# Patient Record
Sex: FEMALE | Race: WHITE | Employment: FULL TIME | URBAN - METROPOLITAN AREA
[De-identification: names, ages, dates, MRNs, and addresses within clinical notes are randomized per-mention and may not be internally consistent; named-entity substitution may affect disease eponyms.]

---
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
## 2022-01-31 ENCOUNTER — OFFICE VISIT (OUTPATIENT)
Dept: OBGYN | Age: 22
End: 2022-01-31
Payer: COMMERCIAL

## 2022-01-31 VITALS
WEIGHT: 216 LBS | HEIGHT: 64 IN | DIASTOLIC BLOOD PRESSURE: 85 MMHG | SYSTOLIC BLOOD PRESSURE: 130 MMHG | BODY MASS INDEX: 36.88 KG/M2

## 2022-01-31 DIAGNOSIS — N94.6 DYSMENORRHEA: Primary | ICD-10-CM

## 2022-01-31 PROCEDURE — 99213 OFFICE O/P EST LOW 20 MIN: CPT

## 2022-01-31 PROCEDURE — G8484 FLU IMMUNIZE NO ADMIN: HCPCS

## 2022-01-31 PROCEDURE — G8427 DOCREV CUR MEDS BY ELIG CLIN: HCPCS

## 2022-01-31 PROCEDURE — G8417 CALC BMI ABV UP PARAM F/U: HCPCS

## 2022-01-31 PROCEDURE — 1036F TOBACCO NON-USER: CPT

## 2022-01-31 RX ORDER — MEDROXYPROGESTERONE ACETATE 150 MG/ML
150 INJECTION, SUSPENSION INTRAMUSCULAR ONCE
Qty: 1 ML | Refills: 3
Start: 2022-01-31 | End: 2022-01-31

## 2022-01-31 ASSESSMENT — ENCOUNTER SYMPTOMS
RESPIRATORY NEGATIVE: 1
GASTROINTESTINAL NEGATIVE: 1
ABDOMINAL PAIN: 0

## 2022-01-31 NOTE — PROGRESS NOTES
1/31/22    Louise Orlando  2000    Chief Complaint   Patient presents with    Dysmenorrhea     Pt still having pain during and after cycle, Marleen Lujan started her on ocp but pt states she doesnt remember to take it, so she would like to discuss other options. Sarwat Alberts is a 25 y.o. female who presents today for evaluation of dysmenorrhea and contraceptive management. Pt has tried Sprintec and Seasonique for dysmenorrhea without satisfactory resolution of symptoms, is interested in discussing other options. Pt states she is mostly interested in depo injection at this time. She reports difficulty with pills, remembering to take them as directed. Past Medical History:   Diagnosis Date    Anxiety     Depression     IBS (irritable bowel syndrome)     Migraine     Obesity     Urinary frequency        Past Surgical History:   Procedure Laterality Date    WISDOM TOOTH EXTRACTION         Social History     Tobacco Use    Smoking status: Former Smoker    Smokeless tobacco: Never Used   Vaping Use    Vaping Use: Never used   Substance Use Topics    Alcohol use: No    Drug use: No       Family History   Problem Relation Age of Onset    Heart Disease Maternal Grandmother     Mental Illness Father     Mental Illness Mother        Current Outpatient Medications   Medication Sig Dispense Refill    Levothyroxine Sodium (LEVOTHROID PO) Take by mouth      medroxyPROGESTERone (DEPO-PROVERA) 150 MG/ML injection Inject 1 mL into the muscle once for 1 dose 1 mL 3    etodolac (LODINE) 400 MG tablet Take 400 mg by mouth 2 times daily      Hydroxychloroquine Sulfate (PLAQUENIL PO) Take by mouth      FLUoxetine (PROZAC) 10 MG capsule Take 10 mg by mouth daily      buPROPion (WELLBUTRIN XL) 150 MG extended release tablet Take 150 mg by mouth every morning       No current facility-administered medications for this visit.        Allergies   Allergen Reactions    Pcn [Penicillins]      Father Anabell Castillotyrone of reaction        No obstetric history on file. Immunization History   Administered Date(s) Administered    COVID-19, Pfizer Purple top, DILUTE for use, 12+ yrs, 30mcg/0.3mL dose 04/14/2021, 05/05/2021, 01/10/2022       Review of Systems   Constitutional: Negative. Negative for fatigue and fever. Respiratory: Negative. Gastrointestinal: Negative. Negative for abdominal pain. Endocrine: Negative. Genitourinary: Positive for menstrual problem. Negative for vaginal pain. Musculoskeletal: Negative. Skin: Negative. Neurological: Negative. Negative for dizziness and headaches. Psychiatric/Behavioral: Negative. /85   Ht 5' 4\" (1.626 m)   Wt 216 lb (98 kg)   LMP 01/14/2022   BMI 37.08 kg/m²     Physical Exam  Vitals and nursing note reviewed. Constitutional:       General: She is not in acute distress. Appearance: Normal appearance. She is obese. HENT:      Head: Normocephalic and atraumatic. Pulmonary:      Effort: Pulmonary effort is normal. No respiratory distress. Musculoskeletal:         General: Normal range of motion. Cervical back: Normal range of motion. Skin:     General: Skin is warm and dry. Neurological:      General: No focal deficit present. Mental Status: She is alert and oriented to person, place, and time. Psychiatric:         Mood and Affect: Mood normal.         Speech: Speech normal.         Behavior: Behavior normal. Behavior is cooperative. Thought Content: Thought content normal.         No results found for this visit on 01/31/22. ASSESSMENT AND PLAN   Diagnosis Orders   1. Dysmenorrhea  medroxyPROGESTERone (DEPO-PROVERA) 150 MG/ML injection     Various contraceptive methods reviewed with patient, including: OCPs, IUDs, Nexplanon, Depo injection, NuvaRing, patch. Commonly reported complaints/side effect profiles with each reviewed in detail, as well as advantages of each option.  Pt wishes to proceed with depo injection

## 2022-03-12 ENCOUNTER — APPOINTMENT (OUTPATIENT)
Dept: ULTRASOUND IMAGING | Age: 22
End: 2022-03-12
Payer: COMMERCIAL

## 2022-03-12 ENCOUNTER — HOSPITAL ENCOUNTER (EMERGENCY)
Age: 22
Discharge: HOME | End: 2022-03-12
Attending: EMERGENCY MEDICINE
Payer: COMMERCIAL

## 2022-03-12 VITALS
RESPIRATION RATE: 18 BRPM | BODY MASS INDEX: 33.32 KG/M2 | HEART RATE: 83 BPM | HEIGHT: 65 IN | SYSTOLIC BLOOD PRESSURE: 145 MMHG | DIASTOLIC BLOOD PRESSURE: 85 MMHG | WEIGHT: 200 LBS | OXYGEN SATURATION: 100 %

## 2022-03-12 DIAGNOSIS — O46.8X1 SUBCHORIONIC HEMORRHAGE OF PLACENTA IN FIRST TRIMESTER, SINGLE OR UNSPECIFIED FETUS: ICD-10-CM

## 2022-03-12 DIAGNOSIS — O41.8X10 SUBCHORIONIC HEMORRHAGE OF PLACENTA IN FIRST TRIMESTER, SINGLE OR UNSPECIFIED FETUS: ICD-10-CM

## 2022-03-12 DIAGNOSIS — O46.90 VAGINAL BLEEDING IN PREGNANCY: Primary | ICD-10-CM

## 2022-03-12 LAB
ABO/RH: NORMAL
ALBUMIN SERPL-MCNC: 4.1 GM/DL (ref 3.4–5)
ALP BLD-CCNC: 71 IU/L (ref 40–129)
ALT SERPL-CCNC: 23 U/L (ref 10–40)
ANION GAP SERPL CALCULATED.3IONS-SCNC: 12 MMOL/L (ref 4–16)
ANTIBODY SCREEN: NEGATIVE
AST SERPL-CCNC: 18 IU/L (ref 15–37)
BACTERIA: NEGATIVE /HPF
BASOPHILS ABSOLUTE: 0.1 K/CU MM
BASOPHILS RELATIVE PERCENT: 0.5 % (ref 0–1)
BILIRUB SERPL-MCNC: 0.3 MG/DL (ref 0–1)
BILIRUBIN URINE: NEGATIVE MG/DL
BLOOD, URINE: ABNORMAL
BUN BLDV-MCNC: 14 MG/DL (ref 6–23)
CALCIUM SERPL-MCNC: 9 MG/DL (ref 8.3–10.6)
CAST TYPE: NEGATIVE /HPF
CHLORIDE BLD-SCNC: 104 MMOL/L (ref 99–110)
CLARITY: CLEAR
CO2: 21 MMOL/L (ref 21–32)
COLOR: YELLOW
CREAT SERPL-MCNC: 0.8 MG/DL (ref 0.6–1.1)
CRYSTAL TYPE: NEGATIVE /HPF
DIFFERENTIAL TYPE: ABNORMAL
EOSINOPHILS ABSOLUTE: 0.3 K/CU MM
EOSINOPHILS RELATIVE PERCENT: 3 % (ref 0–3)
EPITHELIAL CELLS, UA: 3 /HPF
GFR AFRICAN AMERICAN: >60 ML/MIN/1.73M2
GFR NON-AFRICAN AMERICAN: >60 ML/MIN/1.73M2
GLUCOSE BLD-MCNC: 75 MG/DL (ref 70–99)
GLUCOSE, URINE: NEGATIVE MG/DL
GONADOTROPIN, CHORIONIC (HCG) QUANT: 8223 UIU/ML
HCT VFR BLD CALC: 37.3 % (ref 37–47)
HEMOGLOBIN: 12.2 GM/DL (ref 12.5–16)
IMMATURE NEUTROPHIL %: 0.2 % (ref 0–0.43)
KETONES, URINE: NEGATIVE MG/DL
LEUKOCYTE ESTERASE, URINE: NEGATIVE
LYMPHOCYTES ABSOLUTE: 2.8 K/CU MM
LYMPHOCYTES RELATIVE PERCENT: 29.8 % (ref 24–44)
MCH RBC QN AUTO: 29.3 PG (ref 27–31)
MCHC RBC AUTO-ENTMCNC: 32.7 % (ref 32–36)
MCV RBC AUTO: 89.4 FL (ref 78–100)
MONOCYTES ABSOLUTE: 0.8 K/CU MM
MONOCYTES RELATIVE PERCENT: 8.6 % (ref 0–4)
NITRITE URINE, QUANTITATIVE: NEGATIVE
PDW BLD-RTO: 13.4 % (ref 11.7–14.9)
PH, URINE: 7 (ref 5–8)
PLATELET # BLD: 278 K/CU MM (ref 140–440)
PMV BLD AUTO: 11.1 FL (ref 7.5–11.1)
POTASSIUM SERPL-SCNC: 3.9 MMOL/L (ref 3.5–5.1)
PROTEIN UA: NEGATIVE MG/DL
RBC # BLD: 4.17 M/CU MM (ref 4.2–5.4)
RBC URINE: 0 /HPF (ref 0–6)
SEGMENTED NEUTROPHILS ABSOLUTE COUNT: 5.3 K/CU MM
SEGMENTED NEUTROPHILS RELATIVE PERCENT: 57.9 % (ref 36–66)
SODIUM BLD-SCNC: 137 MMOL/L (ref 135–145)
SPECIFIC GRAVITY UA: 1.01 (ref 1–1.03)
TOTAL IMMATURE NEUTOROPHIL: 0.02 K/CU MM
TOTAL PROTEIN: 6.3 GM/DL (ref 6.4–8.2)
UROBILINOGEN, URINE: 0.2 MG/DL (ref 0.2–1)
WBC # BLD: 9.2 K/CU MM (ref 4–10.5)
WBC UA: NEGATIVE /HPF (ref 0–5)

## 2022-03-12 PROCEDURE — 99283 EMERGENCY DEPT VISIT LOW MDM: CPT

## 2022-03-12 PROCEDURE — 84702 CHORIONIC GONADOTROPIN TEST: CPT

## 2022-03-12 PROCEDURE — 86900 BLOOD TYPING SEROLOGIC ABO: CPT

## 2022-03-12 PROCEDURE — 86850 RBC ANTIBODY SCREEN: CPT

## 2022-03-12 PROCEDURE — 85025 COMPLETE CBC W/AUTO DIFF WBC: CPT

## 2022-03-12 PROCEDURE — 76817 TRANSVAGINAL US OBSTETRIC: CPT

## 2022-03-12 PROCEDURE — 86901 BLOOD TYPING SEROLOGIC RH(D): CPT

## 2022-03-12 PROCEDURE — 93975 VASCULAR STUDY: CPT

## 2022-03-12 PROCEDURE — 87591 N.GONORRHOEAE DNA AMP PROB: CPT

## 2022-03-12 PROCEDURE — 87491 CHLMYD TRACH DNA AMP PROBE: CPT

## 2022-03-12 PROCEDURE — 80053 COMPREHEN METABOLIC PANEL: CPT

## 2022-03-12 PROCEDURE — 81001 URINALYSIS AUTO W/SCOPE: CPT

## 2022-03-12 PROCEDURE — 87210 SMEAR WET MOUNT SALINE/INK: CPT

## 2022-03-12 NOTE — ED PROVIDER NOTES
Emergency [address] [facility] EMERGENCY DEPARTMENT    Patient: Peterson Tovar  MRN: [id number]  : 2000  Date of Evaluation: 3/12/2022  ED Provider: Nicole Turner MD    Chief Complaint       Chief Complaint   Patient presents with    Abdominal Cramping     x 2 days pt had + home pregnancy test on     Vaginal Bleeding     TOBY Tovar is a 23 y.o. female she is G1, P0 who presents to the emergency department for evaluation of vaginal spotting in setting of first trimester pregnancy. Patient reports been usual state of health until last for 5 days. She has been having some pelvic cramping. And notes that she was having some spotting over the past 2 to 3 days. She states her last menstrual cycle was on . Patient denies fevers, no history of abdominal surgeries. And no trauma. Patient states that she has been urinating more frequently than usual as well. States that she did stop taking all of her home medications on the advice of her primary care physician. ROS:     At least 10 systems reviewed and otherwise acutely negative except as in the [address]     Past History     Past Medical History:   Diagnosis Date    Anxiety     Depression     IBS (irritable bowel syndrome)     Migraine     Obesity     Urinary frequency      Past Surgical History:   Procedure Laterality Date    WISDOM TOOTH EXTRACTION       Social History     Socioeconomic History    Marital status: Single     Spouse name: None    Number of children: None    Years of education: None    Highest education level: None   Occupational History    None   Tobacco Use    Smoking status: Former Smoker    Smokeless tobacco: Never Used   Vaping Use    Vaping Use: Never used   Substance and Sexual Activity    Alcohol use: No    Drug use: No    Sexual activity: None   Other Topics Concern    None   Social History Narrative    None     Social Determinants of Health     Financial Resource Strain: Low Risk     Difficulty of Paying Living Expenses: Not very hard   Food Insecurity: No Food Insecurity    Worried About Running Out of Food in the Last Year: Never true    Frank of Food in the Last Year: Never true   Transportation Needs:     Lack of Transportation (Medical): Not on file    Lack of Transportation (Non-Medical):  Not on file   Physical Activity:     Days of Exercise per Week: Not on file    Minutes of Exercise per Session: Not on file   Stress:     Feeling of Stress : Not on file   Social Connections:     Frequency of Communication with Friends and Family: Not on file    Frequency of Social Gatherings with Friends and Family: Not on file    Attends Zoroastrianism Services: Not on file    Active Member of [address] [place] [address] [employer] or Organizations: Not on file    Attends Club or Organization Meetings: Not on file    Marital Status: Not on file   Intimate Partner Violence:     Fear of Current or Ex-Partner: Not on file    Emotionally Abused: Not on file    Physically Abused: Not on file    Sexually Abused: Not on file   Housing Stability:     Unable to Pay for Housing in the Last Year: Not on file    Number of [place] in the Last Year: Not on file    Unstable Housing in the Last Year: Not on file       Medications/Allergies     Previous Medications    BUPROPION (WELLBUTRIN XL) 150 MG EXTENDED RELEASE TABLET    Take 150 mg by mouth every morning    ETODOLAC (LODINE) 400 MG TABLET    Take 400 mg by mouth 2 times daily    FLUOXETINE (PROZAC) 10 MG CAPSULE    Take 10 mg by mouth daily    HYDROXYCHLOROQUINE SULFATE (PLAQUENIL PO)    Take by mouth    LEVOTHYROXINE SODIUM (LEVOTHROID PO)    Take by mouth    MEDROXYPROGESTERONE (DEPO-PROVERA) 150 MG/ML INJECTION    Inject 1 mL into the muscle once for 1 dose     Allergies   Allergen Reactions    Pcn [Penicillins]      Father Unsure of reaction         Physical Exam       ED Triage Vitals [03/12/22 5651]   BP Temp Temp src Pulse Resp SpO2 Height Weight   (!) 145/85 -- -- 83 18 100 % 5' 5\" (1.651 m) 200 lb (90.7 kg)     GENERAL APPEARANCE: Awake and alert. Cooperative. No acute distress. HEAD: Normocephalic. Atraumatic. EYES: Sclera anicteric. Pupils equal round reactive to light extraocular movements are intact  ENT: Tolerates saliva. No trismus. Moist mucous membranes  NECK: Supple. Trachea midline. No meningismus  CARDIO: RRR. Radial pulse 2+. No murmurs rubs or gallops appreciated  LUNGS: Respirations unlabored. CTAB. No accessory muscle usage noted. No wheezes rales rhonchi or stridor. ABDOMEN: Soft. Non-distended. Non-tender. No tenderness in right upper quadrant or right lower quadrant to deep palpation  EXTREMITIES: No acute deformities. No unilateral leg swelling or tenderness behind either one of calves  SKIN: Warm and dry. No erythema edema or rashes appreciated  NEUROLOGICAL:  Cranial nerves II through XII grossly intact. No gross facial drooping. Moves all 4 extremities spontaneously. PSYCHIATRIC: Normal mood. Alert and oriented x3. No reported active suicidality or homicidality. Diagnostics   Labs:  No results found for this visit on 03/12/22. Radiographs:  No results found. Procedures/EKG:       ED Course and MDM   In brief, Odette Blas is a 23 y.o. female who presented to the emergency department for evaluation of vaginal spotting in the setting of first trimester pregnancy. Based on patient's history and physical would be concerned about possible threatened miscarriage of the possibilities do include ectopic pregnancies. Given her urinary frequency will check for urinary tract infection. Patient denies any vaginal discharge or bleeding prior to the spotting. Nurse Gina Ott at bedside did perform a pelvic exam on the patient. She has normal female external genitalia. No blood noted the vaginal vault. Small amount of dark blood noted in the cervix. The os is closed. Cultures were sent.   No cervical motion tenderness. Patient is currently awaiting laboratory work and imaging studies. She will be signed out to oncoming physician for continuation of care and treatment. ED Medication Orders (From admission, onward)    None          Final Impression      1. Threatened miscarriage in early pregnancy      DISPOSITION           This patient was cared for in the setting of the COVID-19 pandemic, with nationwide stress on resources and staffing.     (Please note that portions of this note may have been completed with a voice recognition program. Efforts were made to edit the dictations but occasionally words are mis-transcribed.)    Lambert Ramirez MD  [address] [place] [address] MD Tim  03/13/22 9189

## 2022-03-13 NOTE — ED PROVIDER NOTES
purposes.  Clinical follow-up is   advised. 2. Trace fluid adjacent to the probable gestational sac concerning for   perigestational hemorrhage. 3. Normal results normal Doppler flow. Narrative:    EXAMINATION:   FIRST TRIMESTER OBSTETRIC ULTRASOUND; DOPPLER EVALUATION OF THE PELVIS     3/12/2022     TECHNIQUE:   Transvaginal first trimester obstetric pelvic ultrasound was performed with   color Doppler flow evaluation.; Duplex ultrasound using B-mode/gray scaled   imaging and Doppler spectral analysis and color flow was obtained of the   pelvis. COMPARISON:   None     HISTORY:   ORDERING SYSTEM PROVIDED HISTORY: pelvic pain, eval for ectopic pregnancy   TECHNOLOGIST PROVIDED HISTORY:     Reason for exam:->pelvic pain, eval for ectopic pregnancy   Reason for Exam: first trimester pelvic cramping, vaginal bleeding; ORDERING   SYSTEM PROVIDED HISTORY: pain   TECHNOLOGIST PROVIDED HISTORY:     Reason for exam:->pain   Reason for Exam: first trimester pelvic cramping, vaginal bleeding     FINDINGS:   Uterus: 7 with 2 x 5.2 x 3 cm.  Endometrium measures 13 mm in thickness.  No   focal myometrial abnormality. Gestational Sac(s):  Probable intrauterine gestational sac with surrounding   decidual reaction.  The mean sac diameter is 8 mm, which is too small for   accurate dating purposes. Shelby Zeke is trace adjacent fluid. Yolk Sac:  None     Fetal Pole:  None     Crown Rump Length:  Not measured     Fetal Heart Rate:  Not measured     Right ovary: Normal, measuring 3.5 x 2.3 x 2.3 cm including a 2 cm corpus   luteum.  Normal arterial and venous Doppler flow. Left ovary: Normal, measuring 2 x 1 x 1.2 x 1.3 cm.  Normal arterial and   venous Doppler flow. Free fluid: None. Measurements:     Estimated gestational age by current ultrasound:  Indeterminate     Estimated gestational by LMP/prior ultrasound: 5 weeks, 1 day     Estimated Due Date: Indeterminate               Labs Reviewed   CBC WITH AUTO DIFFERENTIAL - Abnormal; Notable for the following components:       Result Value    RBC 4.17 (*)     Hemoglobin 12.2 (*)     Monocytes % 8.6 (*)     All other components within normal limits   COMPREHENSIVE METABOLIC PANEL W/ REFLEX TO MG FOR LOW K - Abnormal; Notable for the following components: Total Protein 6.3 (*)     All other components within normal limits   URINALYSIS WITH MICROSCOPIC - Abnormal; Notable for the following components:    Color, UA YELLOW (*)     Cast Type NEGATIVE (*)     All other components within normal limits   C.TRACHOMATIS N.GONORRHOEAE DNA   WET PREP, GENITAL   HCG, QUANTITATIVE, PREGNANCY   TYPE AND SCREEN     Clinical Impression:  1. Vaginal bleeding in pregnancy    2. Subchorionic hemorrhage of placenta in first trimester, single or unspecified fetus        Disposition referral (if applicable): Your OB/Gyn    Go on 3/14/2022      [facility] Emergency Department  [address] [facility]  [phone]  Go to   If symptoms worsen      Disposition medications (if applicable):  Discharge Medication List as of 3/12/2022 10:08 PM            Comment: Please note this report has been produced using speech recognition software and may contain errors related to that system including errors in grammar, punctuation, and spelling, as well as words and phrases that may be inappropriate. If there are any questions or concerns please feel free to contact the dictating provider for clarification.         Vikram Villagran MD  03/13/22 7054

## 2022-03-13 NOTE — ED NOTES
Discharge instructions reviewed and pt acknowledges understanding. Ambulatory at discharge.      Kiran Arellano RN  03/12/22 0630

## 2022-03-16 LAB
CHLAMYDIA TRACHOMATIS AMPLIFIED DET: NEGATIVE
N GONORRHOEAE AMPLIFIED DET: NEGATIVE

## 2022-07-08 LAB
ABO, EXTERNAL RESULT: NORMAL
HEP B, EXTERNAL RESULT: NEGATIVE
HIV, EXTERNAL RESULT: NORMAL
RH FACTOR, EXTERNAL RESULT: POSITIVE
RPR, EXTERNAL RESULT: NORMAL
RUBELLA TITER, EXTERNAL RESULT: NORMAL

## 2022-09-07 LAB
C. TRACHOMATIS, EXTERNAL RESULT: NEGATIVE
N. GONORRHOEAE, EXTERNAL RESULT: NEGATIVE

## 2022-12-03 ENCOUNTER — HOSPITAL ENCOUNTER (OUTPATIENT)
Age: 22
Discharge: HOME | End: 2022-12-03
Attending: OBSTETRICS & GYNECOLOGY | Admitting: OBSTETRICS & GYNECOLOGY
Payer: COMMERCIAL

## 2022-12-03 VITALS
RESPIRATION RATE: 16 BRPM | WEIGHT: 226 LBS | DIASTOLIC BLOOD PRESSURE: 66 MMHG | SYSTOLIC BLOOD PRESSURE: 113 MMHG | TEMPERATURE: 98.7 F | HEIGHT: 65 IN | HEART RATE: 77 BPM | BODY MASS INDEX: 37.65 KG/M2 | OXYGEN SATURATION: 97 %

## 2022-12-03 PROCEDURE — 99213 OFFICE O/P EST LOW 20 MIN: CPT

## 2022-12-03 PROCEDURE — 84112 EVAL AMNIOTIC FLUID PROTEIN: CPT

## 2022-12-03 PROCEDURE — 99213 OFFICE O/P EST LOW 20 MIN: CPT | Performed by: ADVANCED PRACTICE MIDWIFE

## 2022-12-03 RX ORDER — ONDANSETRON 4 MG/1
4 TABLET, ORALLY DISINTEGRATING ORAL EVERY 8 HOURS PRN
Status: DISCONTINUED | OUTPATIENT
Start: 2022-12-03 | End: 2022-12-03 | Stop reason: HOSPADM

## 2022-12-03 RX ORDER — ACETAMINOPHEN 325 MG/1
650 TABLET ORAL EVERY 4 HOURS PRN
Status: DISCONTINUED | OUTPATIENT
Start: 2022-12-03 | End: 2022-12-03 | Stop reason: HOSPADM

## 2022-12-03 RX ORDER — ONDANSETRON 2 MG/ML
4 INJECTION INTRAMUSCULAR; INTRAVENOUS EVERY 6 HOURS PRN
Status: DISCONTINUED | OUTPATIENT
Start: 2022-12-03 | End: 2022-12-03 | Stop reason: HOSPADM

## 2022-12-03 NOTE — FLOWSHEET NOTE
EFM and toco off discharge instructions given and signed voiced understanding. 1715 Left ambulatory from the [facility] in stable condition with FOB.

## 2022-12-03 NOTE — FLOWSHEET NOTE
Presents ambulatory to the [facility] to check amniotic fluid. Oriented to LT02 and instructed to obtain ccua and change into a gown. States is unable to get a urine sample at this time.

## 2022-12-03 NOTE — FLOWSHEET NOTE
EFM and toco applied  +FM abdomen soft non tender. OB hx and vs obtained. States has been having leaking of fluid for several days. States isn't sure if its discharge or her amniotic fluid. Amnisure obtained. Fredrick DUQUE notified of patients arrival and complaints. Orders received.

## 2022-12-03 NOTE — DISCHARGE INSTRUCTIONS
LABOR    Call your doctor if you have these signs:     Regular tightening of the uterus coming as often as once every 15 minutes     Menstrual-like cramps, they may be regular, or may be continuous and move to   your back. A low, dull backache different from what you normally experience. It may come and go. Vaginal leaking of fluid. Any bleeding from your vagina. Pain, burning or bleeding when you urinate. LABOR    Call your doctor, or come to the hospital, if you have:    Contractions coming about every 3-5 minutes apart, for about one hour. Labor contractions are usually strong enough that you can not walk or talk while having the contractions. ( Contractions can feel like menstrual cramps, tightening in your abdomen, rectal pressure or a backache. This feeling comes and goes.)    Vaginal leaking of fluid. Heavy, bright red bleeding, like the days of your period. ( A little bloody show or spotting is normal in labor.)    ALWAYS CALL YOUR DOCTOR IF YOU:    Notice decreased movement of your baby, different from what you are used to. Have heavy, bright red bleeding, like the heavy days of your periods. Have vaginal leaking of fluid.     Keep your next appointment. _______As scheduled______________________________________    Activity __As tolerated_______________      Diet__As tolerated__________________

## 2022-12-03 NOTE — PROGRESS NOTES
Department of Obstetrics and Gynecology  Labor and Delivery  TRIAGE NOTE      SUBJECTIVE:    Chief Complaint   Patient presents with    Other     Leaking       Pt reports to triage for possible LOF since Thursday. +FM. Pt denies VB. Pt has not had recent sexual intercourse. OBJECTIVE    Vitals:  /71   Pulse 85   Temp 98.7 °F (37.1 °C) (Oral)   Resp 16   Ht 5' 5\" (1.651 m)   Wt 226 lb (102.5 kg)   LMP 01/14/2022   SpO2 97%   BMI 37.61 kg/m²       CONSTITUTIONAL:  negative  RESPIRATORY:  negative  CARDIOVASCULAR:  negative  GASTROINTESTINAL:  negative  ALLERGIC/IMMUNOLOGIC:  negative  NEUROLOGICAL:  negative  BEHAVIOR/PSYCH:  negative    Membranes:    Intact, AmnioSure neg    Fetal heart rate:        Baseline FHR :    135 bpm              Fetal Accelerations:  present        Fetal Decelerations:  absent        Fetal Variability:   moderate    Contraction frequency:  quiet    DATA:  Lab Results   Component Value Date    WBC 9.2 03/12/2022    HGB 12.2 (L) 03/12/2022    HCT 37.3 03/12/2022    MCV 89.4 03/12/2022     03/12/2022       ASSESSMENT:   Thin vaginal discharge during pregnancy      PLAN: Pt educated on s/s of LOF. Pt to follow up in the office.          KYRA Curran - CORNELIUS

## 2023-01-10 ENCOUNTER — HOSPITAL ENCOUNTER (OUTPATIENT)
Age: 23
Discharge: HOME | End: 2023-01-10
Attending: OBSTETRICS & GYNECOLOGY | Admitting: OBSTETRICS & GYNECOLOGY
Payer: COMMERCIAL

## 2023-01-10 VITALS
OXYGEN SATURATION: 98 % | DIASTOLIC BLOOD PRESSURE: 91 MMHG | HEART RATE: 80 BPM | SYSTOLIC BLOOD PRESSURE: 140 MMHG | HEIGHT: 65 IN | TEMPERATURE: 97.8 F | WEIGHT: 235 LBS | RESPIRATION RATE: 16 BRPM | BODY MASS INDEX: 39.15 KG/M2

## 2023-01-10 LAB
BACTERIA: ABNORMAL /HPF
BILIRUBIN URINE: NEGATIVE MG/DL
BLOOD, URINE: NEGATIVE
CLARITY: CLEAR
COLOR: YELLOW
GLUCOSE, URINE: NEGATIVE MG/DL
KETONES, URINE: NEGATIVE MG/DL
LEUKOCYTE ESTERASE, URINE: ABNORMAL
MICROSCOPIC URINE: NORMAL
NITRITE URINE, QUANTITATIVE: NEGATIVE
PH, URINE: 6.5 (ref 5–8)
PROTEIN UA: NEGATIVE MG/DL
RBC URINE: 3 /HPF (ref 0–6)
SPECIFIC GRAVITY UA: <1.005 (ref 1–1.03)
SQUAMOUS EPITHELIAL: 4 /HPF
TRANSITIONAL EPITHELIAL: <1 /HPF
TRICHOMONAS: ABNORMAL /HPF
UROBILINOGEN, URINE: 0.2 MG/DL (ref 0.2–1)
WBC UA: 7 /HPF (ref 0–5)

## 2023-01-10 PROCEDURE — 6370000000 HC RX 637 (ALT 250 FOR IP): Performed by: ADVANCED PRACTICE MIDWIFE

## 2023-01-10 PROCEDURE — 99213 OFFICE O/P EST LOW 20 MIN: CPT | Performed by: ADVANCED PRACTICE MIDWIFE

## 2023-01-10 PROCEDURE — 81001 URINALYSIS AUTO W/SCOPE: CPT

## 2023-01-10 RX ORDER — ACETAMINOPHEN 325 MG/1
650 TABLET ORAL EVERY 4 HOURS PRN
Status: DISCONTINUED | OUTPATIENT
Start: 2023-01-10 | End: 2023-01-10 | Stop reason: HOSPADM

## 2023-01-10 RX ORDER — ONDANSETRON 4 MG/1
4 TABLET, ORALLY DISINTEGRATING ORAL EVERY 8 HOURS PRN
Status: DISCONTINUED | OUTPATIENT
Start: 2023-01-10 | End: 2023-01-10 | Stop reason: HOSPADM

## 2023-01-10 RX ORDER — ONDANSETRON 2 MG/ML
4 INJECTION INTRAMUSCULAR; INTRAVENOUS EVERY 6 HOURS PRN
Status: DISCONTINUED | OUTPATIENT
Start: 2023-01-10 | End: 2023-01-10 | Stop reason: HOSPADM

## 2023-01-10 RX ORDER — NITROFURANTOIN 25; 75 MG/1; MG/1
100 CAPSULE ORAL 2 TIMES DAILY
Qty: 14 CAPSULE | Refills: 0 | Status: SHIPPED | OUTPATIENT
Start: 2023-01-10 | End: 2023-01-17

## 2023-01-10 RX ADMIN — ACETAMINOPHEN 650 MG: 325 TABLET ORAL at 11:12

## 2023-01-10 ASSESSMENT — PAIN DESCRIPTION - ORIENTATION: ORIENTATION: MID

## 2023-01-10 ASSESSMENT — PAIN - FUNCTIONAL ASSESSMENT: PAIN_FUNCTIONAL_ASSESSMENT: ACTIVITIES ARE NOT PREVENTED

## 2023-01-10 ASSESSMENT — PAIN DESCRIPTION - LOCATION: LOCATION: ABDOMEN

## 2023-01-10 ASSESSMENT — PAIN DESCRIPTION - DESCRIPTORS: DESCRIPTORS: ACHING;CRAMPING

## 2023-01-10 ASSESSMENT — PAIN SCALES - GENERAL: PAINLEVEL_OUTOF10: 5

## 2023-01-10 NOTE — FLOWSHEET NOTE
Pt states 2 days ago she started having cramping in her abdomen, she was seen at her OB office yesterday and was told it was normal. Today the pain has increased, has a constant cramping in the lower abdomen and a cramping on the right side that come & goes. Pt denies any constipation or diarrhea, denies having gas or bloating. Pt does state she has a clear vaginal discharge but does not have any other urinary symptoms. Pt states feels fetal movement.

## 2023-01-10 NOTE — DISCHARGE INSTRUCTIONS
OB DISCHARGE INSTRUCTIONS      Discharge Disposition:      Labor   Regular tightening of the uterus coming as often as once every 15 minutes. Menstrual-like cramps, they may be regular, or may be continuous and move to your back. A low, dull backache different from what you normally experience. It may come and go. Vaginal leaking of fluid. Any bleeding from your vagina. Pain, burning or bleeding when you urinate. Labor  Call your doctor; or come to the hospital, if you have:   Contractions coming about every 5 minutes, for about one hour. Labor contractions are usually strong enough that you cannot walk or talk while having contractions. (Contractions can feel like menstrual cramps, tightening in your abdomen, rectal pressure or a backache. This feeling comes and goes.)    Vaginal leaking of fluid. Heavy, bright red bleeding, like the heavy days of your period.  (A little bloody show is normal in labor.)     Always call your Doctor if you:   Notice decreased movement of your baby, different from what you are used to. Have heavy, bright red bleeding, like the heavy days of your period. Have vaginal leaking of watery fluid. Activity:  AS TOLERATED  Diet:  REGULAR  If you have any questions regarding your discharge instructions call us at [phone]  .

## 2023-01-10 NOTE — PROGRESS NOTES
Department of Obstetrics and Gynecology  Labor and Delivery  TRIAGE NOTE      SUBJECTIVE:    Chief Complaint   Patient presents with    Pregnancy Problem     Pt reports to triage for lower abdominal pain and cramping, intermittent in nature since yesterday. Pt has not taken Tylenol for pain. PT reports drinking 2-3 cups of water daily. +FM. PT denies VB, LOF. OBJECTIVE    Vitals:  /76   Pulse 88   Temp 98 °F (36.7 °C) (Oral)   Resp 16   Ht 5' 5\" (1.651 m)   Wt 235 lb (106.6 kg)   LMP 01/14/2022   SpO2 98%   BMI 39.11 kg/m²       CONSTITUTIONAL:  negative  RESPIRATORY:  negative  CARDIOVASCULAR:  negative  GASTROINTESTINAL:  negative  ALLERGIC/IMMUNOLOGIC:  negative  NEUROLOGICAL:  negative  BEHAVIOR/PSYCH:  negative    Cervix:  Closed  Membranes:    Intact    Fetal heart rate:        Baseline FHR :    135 bpm              Fetal Accelerations:  present        Fetal Decelerations:  absent        Fetal Variability:   moderate    Contraction frequency: 1 contraction every 15 minutes     DATA:  Lab Results   Component Value Date    WBC 9.2 03/12/2022    HGB 12.2 (L) 03/12/2022    HCT 37.3 03/12/2022    MCV 89.4 03/12/2022     03/12/2022     Pertinent labs rev'd.  +bacteria in urine. ASSESSMENT:   Lower abdominal cramping in pregnancy      PLAN: Rx sent for possible UTI. Urine culture pending. PTL precautions. Pt to follow up in the office.          KYRA Lyon CNM

## 2023-01-10 NOTE — FLOWSHEET NOTE
Pt ambulatory to labor and delivery, pt shown to tr 2, pt to bathroom to obtain urine and change into gown. Pt placed on toco & FHM.

## 2023-01-10 NOTE — FLOWSHEET NOTE
Pt discharged at this time via ambulating per her request. Ambulating with steady gait. Condition stable. Pt verbalizes understanding to make appointment and to keep appointment with [facility] doctor. Discharge instructions given to pt, pt verbalized understanding. All questions answered. Follow-up instructions given.

## 2023-01-28 ENCOUNTER — HOSPITAL ENCOUNTER (INPATIENT)
Age: 23
LOS: 2 days | Discharge: HOME | DRG: 566 | End: 2023-01-30
Attending: OBSTETRICS & GYNECOLOGY | Admitting: OBSTETRICS & GYNECOLOGY
Payer: COMMERCIAL

## 2023-01-28 ENCOUNTER — APPOINTMENT (OUTPATIENT)
Dept: ULTRASOUND IMAGING | Age: 23
DRG: 566 | End: 2023-01-28
Payer: COMMERCIAL

## 2023-01-28 ENCOUNTER — PREP FOR PROCEDURE (OUTPATIENT)
Dept: OBGYN | Age: 23
End: 2023-01-28

## 2023-01-28 DIAGNOSIS — M79.89 RIGHT LEG SWELLING: Primary | ICD-10-CM

## 2023-01-28 DIAGNOSIS — O22.30 DVT (DEEP VEIN THROMBOSIS) IN PREGNANCY: Primary | ICD-10-CM

## 2023-01-28 PROBLEM — O12.03: Status: ACTIVE | Noted: 2023-01-28

## 2023-01-28 PROBLEM — O22.33 DVT COMPLICATING PREGNANCY, THIRD TRIMESTER: Status: ACTIVE | Noted: 2023-01-28

## 2023-01-28 LAB
ALBUMIN SERPL-MCNC: 3.6 GM/DL (ref 3.4–5)
ALP BLD-CCNC: 141 IU/L (ref 40–129)
ALT SERPL-CCNC: 9 U/L (ref 10–40)
AMPHETAMINES: NEGATIVE
ANION GAP SERPL CALCULATED.3IONS-SCNC: 14 MMOL/L (ref 4–16)
APTT: 36 SECONDS (ref 25.1–37.1)
AST SERPL-CCNC: 9 IU/L (ref 15–37)
BARBITURATE SCREEN URINE: NEGATIVE
BASOPHILS ABSOLUTE: 0.1 K/CU MM
BASOPHILS RELATIVE PERCENT: 0.3 % (ref 0–1)
BENZODIAZEPINE SCREEN, URINE: NEGATIVE
BILIRUB SERPL-MCNC: 0.5 MG/DL (ref 0–1)
BILIRUBIN URINE: NEGATIVE MG/DL
BLOOD, URINE: NEGATIVE
BUN BLDV-MCNC: 8 MG/DL (ref 6–23)
CALCIUM SERPL-MCNC: 8.6 MG/DL (ref 8.3–10.6)
CANNABINOID SCREEN URINE: NEGATIVE
CHLORIDE BLD-SCNC: 99 MMOL/L (ref 99–110)
CLARITY: CLEAR
CO2: 19 MMOL/L (ref 21–32)
COCAINE METABOLITE: NEGATIVE
COLOR: YELLOW
CREAT SERPL-MCNC: 0.4 MG/DL (ref 0.6–1.1)
DIFFERENTIAL TYPE: ABNORMAL
EOSINOPHILS ABSOLUTE: 0.1 K/CU MM
EOSINOPHILS RELATIVE PERCENT: 0.7 % (ref 0–3)
GFR SERPL CREATININE-BSD FRML MDRD: >60 ML/MIN/1.73M2
GLUCOSE BLD-MCNC: 84 MG/DL (ref 70–99)
GLUCOSE, URINE: NEGATIVE MG/DL
HCT VFR BLD CALC: 32.3 % (ref 37–47)
HEMOGLOBIN: 10.5 GM/DL (ref 12.5–16)
IMMATURE NEUTROPHIL %: 1.4 % (ref 0–0.43)
INR BLD: 1.07 INDEX
KETONES, URINE: ABNORMAL MG/DL
LEUKOCYTE ESTERASE, URINE: ABNORMAL
LYMPHOCYTES ABSOLUTE: 1.7 K/CU MM
LYMPHOCYTES RELATIVE PERCENT: 9.6 % (ref 24–44)
MCH RBC QN AUTO: 29.7 PG (ref 27–31)
MCHC RBC AUTO-ENTMCNC: 32.5 % (ref 32–36)
MCV RBC AUTO: 91.5 FL (ref 78–100)
MONOCYTES ABSOLUTE: 1.5 K/CU MM
MONOCYTES RELATIVE PERCENT: 8.4 % (ref 0–4)
NITRITE URINE, QUANTITATIVE: NEGATIVE
NUCLEATED RBC %: 0 %
OPIATES, URINE: NEGATIVE
OXYCODONE: NEGATIVE
PDW BLD-RTO: 13.2 % (ref 11.7–14.9)
PH, URINE: 5.5 (ref 5–8)
PHENCYCLIDINE, URINE: NEGATIVE
PLATELET # BLD: 251 K/CU MM (ref 140–440)
PMV BLD AUTO: 10.6 FL (ref 7.5–11.1)
POTASSIUM SERPL-SCNC: 4 MMOL/L (ref 3.5–5.1)
PRO-BNP: 79.3 PG/ML
PROTEIN UA: 30 MG/DL
PROTHROMBIN TIME: 13.8 SECONDS (ref 11.7–14.5)
RBC # BLD: 3.53 M/CU MM (ref 4.2–5.4)
SEGMENTED NEUTROPHILS ABSOLUTE COUNT: 13.7 K/CU MM
SEGMENTED NEUTROPHILS RELATIVE PERCENT: 79.6 % (ref 36–66)
SODIUM BLD-SCNC: 132 MMOL/L (ref 135–145)
SPECIFIC GRAVITY UA: 1.02 (ref 1–1.03)
TOTAL IMMATURE NEUTOROPHIL: 0.24 K/CU MM
TOTAL NUCLEATED RBC: 0 K/CU MM
TOTAL PROTEIN: 6.5 GM/DL (ref 6.4–8.2)
TROPONIN T: <0.01 NG/ML
UROBILINOGEN, URINE: 1 MG/DL (ref 0.2–1)
WBC # BLD: 17.2 K/CU MM (ref 4–10.5)

## 2023-01-28 PROCEDURE — 84439 ASSAY OF FREE THYROXINE: CPT

## 2023-01-28 PROCEDURE — 1200000000 HC SEMI PRIVATE

## 2023-01-28 PROCEDURE — 85610 PROTHROMBIN TIME: CPT

## 2023-01-28 PROCEDURE — 99222 1ST HOSP IP/OBS MODERATE 55: CPT | Performed by: OBSTETRICS & GYNECOLOGY

## 2023-01-28 PROCEDURE — 59025 FETAL NON-STRESS TEST: CPT | Performed by: OBSTETRICS & GYNECOLOGY

## 2023-01-28 PROCEDURE — 93971 EXTREMITY STUDY: CPT

## 2023-01-28 PROCEDURE — 83880 ASSAY OF NATRIURETIC PEPTIDE: CPT

## 2023-01-28 PROCEDURE — 84484 ASSAY OF TROPONIN QUANT: CPT

## 2023-01-28 PROCEDURE — 80053 COMPREHEN METABOLIC PANEL: CPT

## 2023-01-28 PROCEDURE — 6360000002 HC RX W HCPCS: Performed by: STUDENT IN AN ORGANIZED HEALTH CARE EDUCATION/TRAINING PROGRAM

## 2023-01-28 PROCEDURE — 80307 DRUG TEST PRSMV CHEM ANLYZR: CPT

## 2023-01-28 PROCEDURE — 81001 URINALYSIS AUTO W/SCOPE: CPT

## 2023-01-28 PROCEDURE — 85025 COMPLETE CBC W/AUTO DIFF WBC: CPT

## 2023-01-28 PROCEDURE — 85730 THROMBOPLASTIN TIME PARTIAL: CPT

## 2023-01-28 PROCEDURE — 76805 OB US >/= 14 WKS SNGL FETUS: CPT

## 2023-01-28 RX ORDER — ONDANSETRON 2 MG/ML
4 INJECTION INTRAMUSCULAR; INTRAVENOUS EVERY 6 HOURS PRN
Status: DISCONTINUED | OUTPATIENT
Start: 2023-01-28 | End: 2023-01-30 | Stop reason: HOSPADM

## 2023-01-28 RX ORDER — ACETAMINOPHEN 325 MG/1
650 TABLET ORAL EVERY 4 HOURS PRN
Status: DISCONTINUED | OUTPATIENT
Start: 2023-01-28 | End: 2023-01-30 | Stop reason: HOSPADM

## 2023-01-28 RX ORDER — HEPARIN SODIUM 10000 [USP'U]/100ML
5-30 INJECTION, SOLUTION INTRAVENOUS CONTINUOUS
Status: DISCONTINUED | OUTPATIENT
Start: 2023-01-28 | End: 2023-01-30

## 2023-01-28 RX ORDER — SWAB
1 SWAB, NON-MEDICATED MISCELLANEOUS DAILY
Status: DISCONTINUED | OUTPATIENT
Start: 2023-01-29 | End: 2023-01-30 | Stop reason: HOSPADM

## 2023-01-28 RX ORDER — ACETAMINOPHEN 325 MG/1
650 TABLET ORAL EVERY 4 HOURS PRN
Status: DISCONTINUED | OUTPATIENT
Start: 2023-01-28 | End: 2023-01-28

## 2023-01-28 RX ORDER — SODIUM CHLORIDE 0.9 % (FLUSH) 0.9 %
5-40 SYRINGE (ML) INJECTION PRN
Status: DISCONTINUED | OUTPATIENT
Start: 2023-01-28 | End: 2023-01-30 | Stop reason: HOSPADM

## 2023-01-28 RX ORDER — HYDROXYZINE PAMOATE 25 MG/1
50 CAPSULE ORAL NIGHTLY PRN
Status: DISCONTINUED | OUTPATIENT
Start: 2023-01-28 | End: 2023-01-30 | Stop reason: HOSPADM

## 2023-01-28 RX ORDER — ACETAMINOPHEN 650 MG/1
650 SUPPOSITORY RECTAL EVERY 4 HOURS PRN
Status: DISCONTINUED | OUTPATIENT
Start: 2023-01-28 | End: 2023-01-30 | Stop reason: HOSPADM

## 2023-01-28 RX ORDER — ACETAMINOPHEN 325 MG/1
650 TABLET ORAL EVERY 4 HOURS PRN
Status: CANCELLED | OUTPATIENT
Start: 2023-01-28

## 2023-01-28 RX ORDER — SODIUM CHLORIDE 9 MG/ML
INJECTION, SOLUTION INTRAVENOUS PRN
Status: DISCONTINUED | OUTPATIENT
Start: 2023-01-28 | End: 2023-01-30 | Stop reason: HOSPADM

## 2023-01-28 RX ORDER — ONDANSETRON 4 MG/1
4 TABLET, ORALLY DISINTEGRATING ORAL EVERY 8 HOURS PRN
Status: DISCONTINUED | OUTPATIENT
Start: 2023-01-28 | End: 2023-01-30 | Stop reason: HOSPADM

## 2023-01-28 RX ORDER — HEPARIN SODIUM 1000 [USP'U]/ML
80 INJECTION, SOLUTION INTRAVENOUS; SUBCUTANEOUS PRN
Status: DISCONTINUED | OUTPATIENT
Start: 2023-01-29 | End: 2023-01-30 | Stop reason: HOSPADM

## 2023-01-28 RX ORDER — HEPARIN SODIUM 1000 [USP'U]/ML
40 INJECTION, SOLUTION INTRAVENOUS; SUBCUTANEOUS PRN
Status: DISCONTINUED | OUTPATIENT
Start: 2023-01-29 | End: 2023-01-30 | Stop reason: HOSPADM

## 2023-01-28 RX ORDER — SODIUM CHLORIDE 0.9 % (FLUSH) 0.9 %
5-40 SYRINGE (ML) INJECTION EVERY 12 HOURS SCHEDULED
Status: DISCONTINUED | OUTPATIENT
Start: 2023-01-28 | End: 2023-01-30 | Stop reason: HOSPADM

## 2023-01-28 RX ORDER — HEPARIN SODIUM 1000 [USP'U]/ML
80 INJECTION, SOLUTION INTRAVENOUS; SUBCUTANEOUS ONCE
Status: COMPLETED | OUTPATIENT
Start: 2023-01-28 | End: 2023-01-28

## 2023-01-28 RX ADMIN — HEPARIN SODIUM 8480 UNITS: 1000 INJECTION INTRAVENOUS; SUBCUTANEOUS at 23:45

## 2023-01-28 RX ADMIN — HEPARIN SODIUM 18 UNITS/KG/HR: 10000 INJECTION, SOLUTION INTRAVENOUS at 23:38

## 2023-01-29 PROBLEM — Z3A.33 33 WEEKS GESTATION OF PREGNANCY: Status: ACTIVE | Noted: 2023-01-29

## 2023-01-29 PROBLEM — O22.30 DVT (DEEP VEIN THROMBOSIS) IN PREGNANCY: Status: ACTIVE | Noted: 2023-01-28

## 2023-01-29 PROBLEM — O99.213 OBESITY AFFECTING PREGNANCY IN THIRD TRIMESTER: Status: ACTIVE | Noted: 2023-01-29

## 2023-01-29 PROBLEM — O09.93 SUPERVISION OF HIGH RISK PREGNANCY IN THIRD TRIMESTER: Status: ACTIVE | Noted: 2023-01-29

## 2023-01-29 LAB
APTT: 34.1 SECONDS (ref 25.1–37.1)
APTT: 37.9 SECONDS (ref 25.1–37.1)
APTT: 44.7 SECONDS (ref 25.1–37.1)
BACTERIA: ABNORMAL /HPF
HCT VFR BLD CALC: 29.7 % (ref 37–47)
HEMOGLOBIN: 9.5 GM/DL (ref 12.5–16)
MCH RBC QN AUTO: 29.4 PG (ref 27–31)
MCHC RBC AUTO-ENTMCNC: 32 % (ref 32–36)
MCV RBC AUTO: 92 FL (ref 78–100)
MUCUS: ABNORMAL HPF
PDW BLD-RTO: 13.2 % (ref 11.7–14.9)
PLATELET # BLD: 209 K/CU MM (ref 140–440)
PMV BLD AUTO: 10.7 FL (ref 7.5–11.1)
RBC # BLD: 3.23 M/CU MM (ref 4.2–5.4)
RBC URINE: <1 /HPF (ref 0–6)
SQUAMOUS EPITHELIAL: 4 /HPF
TRICHOMONAS: ABNORMAL /HPF
TROPONIN T: <0.01 NG/ML
WBC # BLD: 14.9 K/CU MM (ref 4–10.5)
WBC UA: 5 /HPF (ref 0–5)

## 2023-01-29 PROCEDURE — 85027 COMPLETE CBC AUTOMATED: CPT

## 2023-01-29 PROCEDURE — 93005 ELECTROCARDIOGRAM TRACING: CPT | Performed by: STUDENT IN AN ORGANIZED HEALTH CARE EDUCATION/TRAINING PROGRAM

## 2023-01-29 PROCEDURE — 94761 N-INVAS EAR/PLS OXIMETRY MLT: CPT

## 2023-01-29 PROCEDURE — 99222 1ST HOSP IP/OBS MODERATE 55: CPT | Performed by: INTERNAL MEDICINE

## 2023-01-29 PROCEDURE — 1200000000 HC SEMI PRIVATE

## 2023-01-29 PROCEDURE — 6360000002 HC RX W HCPCS: Performed by: STUDENT IN AN ORGANIZED HEALTH CARE EDUCATION/TRAINING PROGRAM

## 2023-01-29 PROCEDURE — 36415 COLL VENOUS BLD VENIPUNCTURE: CPT

## 2023-01-29 PROCEDURE — 84484 ASSAY OF TROPONIN QUANT: CPT

## 2023-01-29 PROCEDURE — 6370000000 HC RX 637 (ALT 250 FOR IP): Performed by: OBSTETRICS & GYNECOLOGY

## 2023-01-29 PROCEDURE — 85730 THROMBOPLASTIN TIME PARTIAL: CPT

## 2023-01-29 PROCEDURE — 99232 SBSQ HOSP IP/OBS MODERATE 35: CPT | Performed by: OBSTETRICS & GYNECOLOGY

## 2023-01-29 RX ADMIN — HEPARIN SODIUM 20 UNITS/KG/HR: 10000 INJECTION, SOLUTION INTRAVENOUS at 10:52

## 2023-01-29 RX ADMIN — ACETAMINOPHEN 650 MG: 325 TABLET ORAL at 23:23

## 2023-01-29 RX ADMIN — HEPARIN SODIUM 4240 UNITS: 1000 INJECTION INTRAVENOUS; SUBCUTANEOUS at 15:31

## 2023-01-29 RX ADMIN — HEPARIN SODIUM 8480 UNITS: 1000 INJECTION INTRAVENOUS; SUBCUTANEOUS at 23:18

## 2023-01-29 RX ADMIN — ACETAMINOPHEN 650 MG: 325 TABLET ORAL at 09:18

## 2023-01-29 RX ADMIN — ACETAMINOPHEN 650 MG: 325 TABLET ORAL at 15:42

## 2023-01-29 RX ADMIN — Medication 1 TABLET: at 09:12

## 2023-01-29 RX ADMIN — HEPARIN SODIUM 27 UNITS/KG/HR: 10000 INJECTION, SOLUTION INTRAVENOUS at 23:53

## 2023-01-29 RX ADMIN — ACETAMINOPHEN 650 MG: 325 TABLET ORAL at 00:08

## 2023-01-29 ASSESSMENT — PAIN SCALES - GENERAL
PAINLEVEL_OUTOF10: 0
PAINLEVEL_OUTOF10: 3
PAINLEVEL_OUTOF10: 2
PAINLEVEL_OUTOF10: 4

## 2023-01-29 ASSESSMENT — ENCOUNTER SYMPTOMS
ABDOMINAL DISTENTION: 0
SHORTNESS OF BREATH: 0
EYE PAIN: 0
PHOTOPHOBIA: 0
TROUBLE SWALLOWING: 0
BACK PAIN: 0
SINUS PAIN: 0
EYE ITCHING: 0
VOMITING: 0
EYE REDNESS: 0
CHEST TIGHTNESS: 0
COUGH: 0
RHINORRHEA: 0
EYE DISCHARGE: 0
DIARRHEA: 0
NAUSEA: 0
CONSTIPATION: 0
BLOOD IN STOOL: 0
SORE THROAT: 0
ABDOMINAL PAIN: 0
VOICE CHANGE: 0

## 2023-01-29 ASSESSMENT — PAIN DESCRIPTION - DESCRIPTORS
DESCRIPTORS: ACHING;DISCOMFORT
DESCRIPTORS: BURNING
DESCRIPTORS: ACHING;DISCOMFORT;BURNING
DESCRIPTORS: BURNING

## 2023-01-29 ASSESSMENT — PAIN DESCRIPTION - LOCATION
LOCATION: LEG

## 2023-01-29 ASSESSMENT — PAIN DESCRIPTION - FREQUENCY: FREQUENCY: INTERMITTENT

## 2023-01-29 ASSESSMENT — PAIN SCALES - WONG BAKER
WONGBAKER_NUMERICALRESPONSE: 0
WONGBAKER_NUMERICALRESPONSE: 0

## 2023-01-29 ASSESSMENT — PAIN DESCRIPTION - ORIENTATION
ORIENTATION: LEFT

## 2023-01-29 ASSESSMENT — PAIN - FUNCTIONAL ASSESSMENT: PAIN_FUNCTIONAL_ASSESSMENT: ACTIVITIES ARE NOT PREVENTED

## 2023-01-29 ASSESSMENT — PAIN DESCRIPTION - PAIN TYPE: TYPE: ACUTE PAIN

## 2023-01-29 ASSESSMENT — PAIN DESCRIPTION - ONSET: ONSET: ON-GOING

## 2023-01-29 NOTE — FLOWSHEET NOTE
Pt arrived to triage with c/o left leg pain. States she has had this pain since Thursday. States she c/o left thigh swelling and burning sensation. Denies complications with pregnancy. Denies leaking of fluid and or bleeding. States positive fetal movement.

## 2023-01-29 NOTE — PROGRESS NOTES
In-Patient Progress Note    Patient: Sue Orlando 23 y.o. female MRN: [id number]     Date of Service: 2023    Hospital Day: 2      Chief complaint: had no chief complaint listed for this encounter. Assessment and Plan   Neto Draper, a 23 y.o. female, , with a history of Juvenile RA, currently on third trimester 33W 3days, was admitted on 2023 with complaints of LLE pain x 3 days. Assessment and plan    # Left femoral DVT  Presented with worsening LLE pain and swelling x3 days. No SOB, CP, presyncope, syncope, palpitations or numbness. No previous episodes. Hx of JRA and as aunt with hypercoagulable state. Mild leg swelling on exam, neurovascularly intact distally. US showing DVT of left FV and GSV. Was started on Heparin gtt on admission  -Cardiology was consulted to evaluate for possible thrombectomy. On evaluation this morning, no significant left leg swelling, mild erythema on left lateral and posterior leg.   -Will change to LMWH if no plans on thrombectomy. # Juvenile RA  - Followed by Rheum outpatient, has been off DMARDs since beginning of pregnancy. # Peptic ulcer prophylaxis: -  # DVT Prophylaxis: heparin drip  #CODE STATUS: full code      Current living situation: home  Expected Disposition: home  Estimated discharge date: 1-2 days      Review of System     Review of Systems   Constitutional:  Negative for activity change, appetite change, chills, fatigue and fever. HENT:  Negative for congestion, ear discharge, ear pain, hearing loss, nosebleeds, postnasal drip, rhinorrhea, sinus pain, sore throat, trouble swallowing and voice change. Eyes:  Negative for photophobia, pain, discharge, redness and itching. Respiratory:  Negative for cough, chest tightness and shortness of breath. Cardiovascular:  Positive for leg swelling (left). Negative for chest pain and palpitations.    Gastrointestinal:  Negative for abdominal distention, abdominal pain, blood in stool, constipation, diarrhea, nausea and vomiting. Endocrine: Negative for cold intolerance, heat intolerance, polydipsia, polyphagia and polyuria. Genitourinary:  Negative for difficulty urinating, dysuria, flank pain, frequency, hematuria and urgency. Musculoskeletal:  Negative for arthralgias, back pain, gait problem, joint swelling and myalgias. Skin:  Negative for pallor, rash and wound. Allergic/Immunologic: Negative for environmental allergies and food allergies. Neurological:  Negative for dizziness, tremors, seizures, syncope, speech difficulty, weakness, light-headedness, numbness and headaches. Hematological:  Negative for adenopathy. Does not bruise/bleed easily. Psychiatric/Behavioral:  Negative for agitation, confusion, decreased concentration, hallucinations, self-injury, sleep disturbance and suicidal ideas. The patient is not nervous/anxious and is not hyperactive. I have reviewed all pertinent PMHx, PSHx, FamHx, SocialHx, medications, and allergies and updated history as appropriate. Physical Exam   VITAL SIGNS:  /85   Pulse 88   Temp 97.6 °F (36.4 °C) (Oral)   Resp 20   Wt 233 lb 11 oz (106 kg)   LMP 2022   SpO2 98%   BMI 38.89 kg/m²   Tmax over 24 hours:  Temp (24hrs), Av.4 °F (36.9 °C), Min:97.6 °F (36.4 °C), Max:98.8 °F (37.1 °C)      Patient Vitals for the past 6 hrs:   BP Temp Temp src Pulse Resp SpO2   23 0747 -- -- -- 88 20 98 %   23 0744 133/85 97.6 °F (36.4 °C) Oral -- 18 --   23 0551 126/80 -- -- 82 16 97 %       No intake or output data in the 24 hours ending 23 0814  Wt Readings from Last 2 Encounters:   23 233 lb 11 oz (106 kg)   01/10/23 235 lb (106.6 kg)     Body mass index is 38.89 kg/m². Physical Exam  Constitutional:       General: She is not in acute distress. Appearance: She is well-developed. HENT:      Head: Normocephalic and atraumatic.       Right Ear: External ear normal.      Left Ear: External ear normal.      Nose: Nose normal.   Eyes:      General: No scleral icterus. Right eye: No discharge. Left eye: No discharge. Conjunctiva/sclera: Conjunctivae normal.      Pupils: Pupils are equal, round, and reactive to light. Neck:      Thyroid: No thyromegaly. Vascular: No JVD. Trachea: No tracheal deviation. Cardiovascular:      Rate and Rhythm: Normal rate and regular rhythm. Heart sounds: Normal heart sounds. No murmur heard. No friction rub. No gallop. Pulmonary:      Effort: Pulmonary effort is normal. No respiratory distress. Breath sounds: Normal breath sounds. No stridor. No wheezing or rales. Chest:      Chest wall: No tenderness. Abdominal:      General: Bowel sounds are normal. There is no distension. Palpations: Abdomen is soft. There is no mass. Tenderness: There is no abdominal tenderness. There is no guarding or rebound. Hernia: No hernia is present. Genitourinary:     Vagina: Normal. No vaginal discharge. Rectum: Guaiac result negative. Musculoskeletal:         General: No tenderness or deformity. Normal range of motion. Cervical back: Normal range of motion and neck supple. Left lower leg: Edema (mild edema and erythema) present. Lymphadenopathy:      Cervical: No cervical adenopathy. Skin:     General: Skin is warm and dry. Capillary Refill: Capillary refill takes less than 2 seconds. Coloration: Skin is not pale. Findings: No erythema or rash. Neurological:      Mental Status: She is alert and oriented to person, place, and time. Cranial Nerves: No cranial nerve deficit. Motor: No abnormal muscle tone. Coordination: Coordination normal.      Deep Tendon Reflexes: Reflexes normal.   Psychiatric:         Behavior: Behavior normal.         Thought Content:  Thought content normal.         Judgment: Judgment normal.         Current Medications      sodium chloride flush  5-40 mL IntraVENous 2 times per day    prenatal vitamin  1 tablet Oral Daily         Labs and Imaging Studies   Laboratory findings:  US OB 14 PLUS WEEKS SINGLE OR FIRST GESTATION    Result Date: 1/29/2023  EXAMINATION: TRANSABDOMINAL SECOND/THIRD TRIMESTER OBSTETRIC PELVIC ULTRASOUND WITH COLOR DOPPLER FLOW 1/28/2023 10:39 pm TECHNIQUE: TRANSABDOMINAL PELVIC ULTRASOUND WITH COLOR DOPPLER FLOW COMPARISON: None. HISTORY: ORDERING SYSTEM PROVIDED HISTORY: pregnancy with dvt for fetal growth and fluid TECHNOLOGIST PROVIDED HISTORY: Reason for exam:->pregnancy with dvt for fetal growth and fluid FINDINGS: GENERAL OBSERVATIONS: PREGNANCY:   Single CARDIAC ACTIVITY:  Yes FETAL HEART RATE: 132 FETAL BODY & LIMB MOVEMENTS:  Yes FETAL POSITION:  Cephalic PLACENTA LOCATION:  Posterior MARY:   17.1 cm FETAL ANATOMY: Fetal anatomy survey was not performed. ESTIMATED FETAL AGE: BY LMP:  33 weeks, 3 days CURRENT US:  32 weeks, 5 days ESTIMATED FETAL WEIGHT:   2092 grams MEASUREMENTS: BPD: 8.12 cm, HEAD CIRCUMFERENCE:  30.3 cm, ABD. CIRCUMFERENCE:  28.7 cm FEMUR LENGTH:  6.45 cm CERVICAL LENGTH:  Not measured. A single live intrauterine pregnancy with estimated gestational age of 26 weeks, 5 days by ultrasound. The estimated fetal weight is 2092 grams. MARY measures 17.1 cm. VL DUP LOWER EXTREMITY VENOUS LEFT    Result Date: 1/28/2023  EXAMINATION: DUPLEX VENOUS ULTRASOUND OF THE LEFT LOWER EXTREMITY 1/28/2023 9:04 pm TECHNIQUE: Duplex ultrasound using B-mode/gray scaled imaging and Doppler spectral analysis and color flow was obtained of the deep venous structures of the left extremity. COMPARISON: None.  HISTORY: ORDERING SYSTEM PROVIDED HISTORY: right leg swelling and pain, pregnancy TECHNOLOGIST PROVIDED HISTORY: Reason for exam:->right leg swelling and pain, pregnancy FINDINGS: There is thrombus in the common femoral, deep femoral and proximal to distal femoral veins as well as the greater saphenous vein junction. DVT in the common femoral, deep femoral and femoral veins as well as the greater saphenous vein junction. Results were verbally communicated by the performing technologist to the patient's nurse Alee on 01/28/2023 at 9:10 p.m.        Recent Results (from the past 24 hour(s))   Urinalysis    Collection Time: 01/28/23 10:11 PM   Result Value Ref Range    Color, UA YELLOW YELLOW    Clarity, UA CLEAR CLEAR    Glucose, Urine NEGATIVE NEGATIVE MG/DL    Bilirubin Urine NEGATIVE NEGATIVE MG/DL    Ketones, Urine TRACE (A) NEGATIVE MG/DL    Specific Gravity, UA 1.025 1.001 - 1.035    Blood, Urine NEGATIVE NEGATIVE    pH, Urine 5.5 5.0 - 8.0    Protein, UA 30 (A) NEGATIVE MG/DL    Urobilinogen, Urine 1.0 0.2 - 1.0 MG/DL    Nitrite Urine, Quantitative NEGATIVE NEGATIVE    Leukocyte Esterase, Urine TRACE (A) NEGATIVE   DRUG SCREEN MULTI URINE    Collection Time: 01/28/23 10:11 PM   Result Value Ref Range    Cannabinoid Scrn, Ur NEGATIVE NEGATIVE    Amphetamines NEGATIVE NEGATIVE    Cocaine Metabolite NEGATIVE NEGATIVE    Benzodiazepine Screen, Urine NEGATIVE NEGATIVE    Barbiturate Screen, Ur NEGATIVE NEGATIVE    Opiates, Urine NEGATIVE NEGATIVE    Phencyclidine, Urine NEGATIVE NEGATIVE    Oxycodone NEGATIVE NEGATIVE   Microscopic Urinalysis    Collection Time: 01/28/23 10:11 PM   Result Value Ref Range    RBC, UA <1 0 - 6 /HPF    WBC, UA 5 0 - 5 /HPF    Bacteria, UA FEW (A) NEGATIVE /HPF    Squam Epithel, UA 4 /HPF    Mucus, UA OCCASIONAL (A) NEGATIVE HPF    Trichomonas, UA NONE SEEN NONE SEEN /HPF   APTT    Collection Time: 01/28/23 10:16 PM   Result Value Ref Range    aPTT 36.0 25.1 - 37.1 SECONDS   Protime-INR    Collection Time: 01/28/23 10:16 PM   Result Value Ref Range    Protime 13.8 11.7 - 14.5 SECONDS    INR 1.07 INDEX   Comprehensive Metabolic Panel w/ Reflex to MG    Collection Time: 01/28/23 10:16 PM   Result Value Ref Range    Sodium 132 (L) 135 - 145 MMOL/L    Potassium 4.0 3.5 - 5.1 MMOL/L Chloride 99 99 - 110 mMol/L    CO2 19 (L) 21 - 32 MMOL/L    BUN 8 6 - 23 MG/DL    Creatinine 0.4 (L) 0.6 - 1.1 MG/DL    Est, Glom Filt Rate >60 >60 mL/min/1.73m2    Glucose 84 70 - 99 MG/DL    Calcium 8.6 8.3 - 10.6 MG/DL    Albumin 3.6 3.4 - 5.0 GM/DL    Total Protein 6.5 6.4 - 8.2 GM/DL    Total Bilirubin 0.5 0.0 - 1.0 MG/DL    ALT 9 (L) 10 - 40 U/L    AST 9 (L) 15 - 37 IU/L    Alkaline Phosphatase 141 (H) 40 - 129 IU/L    Anion Gap 14 4 - 16   CBC with Auto Differential    Collection Time: 01/28/23 10:16 PM   Result Value Ref Range    WBC 17.2 (H) 4.0 - 10.5 K/CU MM    RBC 3.53 (L) 4.2 - 5.4 M/CU MM    Hemoglobin 10.5 (L) 12.5 - 16.0 GM/DL    Hematocrit 32.3 (L) 37 - 47 %    MCV 91.5 78 - 100 FL    MCH 29.7 27 - 31 PG    MCHC 32.5 32.0 - 36.0 %    RDW 13.2 11.7 - 14.9 %    Platelets 369 531 - 367 K/CU MM    MPV 10.6 7.5 - 11.1 FL    Differential Type AUTOMATED DIFFERENTIAL     Segs Relative 79.6 (H) 36 - 66 %    Lymphocytes % 9.6 (L) 24 - 44 %    Monocytes % 8.4 (H) 0 - 4 %    Eosinophils % 0.7 0 - 3 %    Basophils % 0.3 0 - 1 %    Segs Absolute 13.7 K/CU MM    Lymphocytes Absolute 1.7 K/CU MM    Monocytes Absolute 1.5 K/CU MM    Eosinophils Absolute 0.1 K/CU MM    Basophils Absolute 0.1 K/CU MM    Nucleated RBC % 0.0 %    Total Nucleated RBC 0.0 K/CU MM    Total Immature Neutrophil 0.24 K/CU MM    Immature Neutrophil % 1.4 (H) 0 - 0.43 %   Troponin    Collection Time: 01/28/23 10:16 PM   Result Value Ref Range    Troponin T <0.010 <0.01 NG/ML   Brain Natriuretic Peptide    Collection Time: 01/28/23 10:16 PM   Result Value Ref Range    Pro-BNP 79.30 <300 PG/ML   EKG 12 Lead    Collection Time: 01/29/23  1:08 AM   Result Value Ref Range    Ventricular Rate 96 BPM    Atrial Rate 96 BPM    P-R Interval 122 ms    QRS Duration 72 ms    Q-T Interval 366 ms    QTc Calculation (Bazett) 462 ms    P Axis 33 degrees    R Axis 33 degrees    T Axis 37 degrees    Diagnosis       Normal sinus rhythm  Normal ECG  No previous ECGs available     CBC    Collection Time: 01/29/23  4:45 AM   Result Value Ref Range    WBC 14.9 (H) 4.0 - 10.5 K/CU MM    RBC 3.23 (L) 4.2 - 5.4 M/CU MM    Hemoglobin 9.5 (L) 12.5 - 16.0 GM/DL    Hematocrit 29.7 (L) 37 - 47 %    MCV 92.0 78 - 100 FL    MCH 29.4 27 - 31 PG    MCHC 32.0 32.0 - 36.0 %    RDW 13.2 11.7 - 14.9 %    Platelets 839 396 - 307 K/CU MM    MPV 10.7 7.5 - 11.1 FL   APTT    Collection Time: 01/29/23  4:45 AM   Result Value Ref Range    aPTT 44.7 (H) 25.1 - 37.1 SECONDS   Troponin    Collection Time: 01/29/23  4:45 AM   Result Value Ref Range    Troponin T <0.010 <0.01 NG/ML           Electronically signed by Alexis De La Cruz MD on 1/29/2023 at 8:14 AM      Comment: Please note this report has been produced using speech recognition software and may contain errors related to that system including errors in grammar, punctuation, and spelling, as well as words and phrases that may be inappropriate. If there are any questions or concerns please feel free to contact the dictating provider for clarification.

## 2023-01-29 NOTE — PROGRESS NOTES
Admit Date: 1/28/2023  Hospital day 2      Assessment:   Intrauterine pregnancy at 33 weeks 4 days with  DVT in the common femoral, deep femoral and femoral veins as well as the   greater saphenous vein junction. Fetal status is reassuring based on fetal monitoring and ultrasound showing normal growth and fluid      Plan:     Twice daily nonstress test  Appreciate hospitalist management of the significant lower extremity DVT        Subjective:       Patient reports active fetal movement. She denies vaginal bleeding, denies uterine contraction, denies leakage of fluid. She has no chest pain and no shortness of breath. Scheduled Meds:   sodium chloride flush  5-40 mL IntraVENous 2 times per day    prenatal vitamin  1 tablet Oral Daily     Continuous Infusions:   sodium chloride      heparin (PORCINE) Infusion 20 Units/kg/hr (01/29/23 0556)     PRN Meds:sodium chloride flush, sodium chloride, ondansetron **OR** ondansetron, acetaminophen **OR** acetaminophen, hydrOXYzine pamoate, heparin (porcine), heparin (porcine)        Objective:     Patient Vitals for the past 8 hrs:   BP Temp Temp src Pulse Resp SpO2   01/29/23 0747 -- -- -- 88 20 98 %   01/29/23 0744 133/85 97.6 °F (36.4 °C) Oral -- 18 --   01/29/23 0551 126/80 -- -- 82 16 97 %     No intake/output data recorded. No intake/output data recorded. /85   Pulse 88   Temp 97.6 °F (36.4 °C) (Oral)   Resp 20   Wt 233 lb 11 oz (106 kg)   LMP 01/14/2022   SpO2 98%   BMI 38.89 kg/m²   General appearance: alert, appears stated age, and cooperative  Abdomen:  Soft, nontender, nontender gravid uterus  Extremities:  Significant edema of the left lower extremity from the upper leg to the foot.   She has tactile sensations of the leg and normal pulses to the extremity      Data Review  EXAMINATION:   TRANSABDOMINAL SECOND/THIRD TRIMESTER OBSTETRIC PELVIC ULTRASOUND WITH COLOR   DOPPLER FLOW       1/28/2023 10:39 pm       TECHNIQUE:   TRANSABDOMINAL PELVIC ULTRASOUND WITH COLOR DOPPLER FLOW       COMPARISON:   None. HISTORY:   ORDERING SYSTEM PROVIDED HISTORY: pregnancy with dvt for fetal growth and   fluid   TECHNOLOGIST PROVIDED HISTORY:       Reason for exam:->pregnancy with dvt for fetal growth and fluid       FINDINGS:       GENERAL OBSERVATIONS:       PREGNANCY:   Single       CARDIAC ACTIVITY:  Yes       FETAL HEART RATE: 132       FETAL BODY & LIMB MOVEMENTS:  Yes       FETAL POSITION:  Cephalic       PLACENTA LOCATION:  Posterior       MARY:   17.1 cm           FETAL ANATOMY:       Fetal anatomy survey was not performed. ESTIMATED FETAL AGE:       BY LMP:  33 weeks, 3 days       CURRENT US:  32 weeks, 5 days       ESTIMATED FETAL WEIGHT:   2092 grams           MEASUREMENTS:       BPD: 8.12 cm,       HEAD CIRCUMFERENCE:  30.3 cm,       ABD. CIRCUMFERENCE:  28.7 cm       FEMUR LENGTH:  6.45 cm       CERVICAL LENGTH:  Not measured. EXAMINATION:   DUPLEX VENOUS ULTRASOUND OF THE LEFT LOWER EXTREMITY       1/28/2023 9:04 pm       TECHNIQUE:   Duplex ultrasound using B-mode/gray scaled imaging and Doppler spectral   analysis and color flow was obtained of the deep venous structures of the   left extremity. COMPARISON:   None. HISTORY:   ORDERING SYSTEM PROVIDED HISTORY: right leg swelling and pain, pregnancy   TECHNOLOGIST PROVIDED HISTORY:   Reason for exam:->right leg swelling and pain, pregnancy       FINDINGS:   There is thrombus in the common femoral, deep femoral and proximal to distal   femoral veins as well as the greater saphenous vein junction. Impression   DVT in the common femoral, deep femoral and femoral veins as well as the   greater saphenous vein junction. Results were verbally communicated by the performing technologist to the   patient's nurse Tino Cannon on 01/28/2023 at 9:10 p.m.       Impression   A single live intrauterine pregnancy with estimated gestational age of 27   weeks, 5 days by ultrasound. The estimated fetal weight is 2092 grams. MARY measures 17.1 cm. EXAMINATION:   DUPLEX VENOUS ULTRASOUND OF THE LEFT LOWER EXTREMITY       1/28/2023 9:04 pm       TECHNIQUE:   Duplex ultrasound using B-mode/gray scaled imaging and Doppler spectral   analysis and color flow was obtained of the deep venous structures of the   left extremity. COMPARISON:   None. HISTORY:   ORDERING SYSTEM PROVIDED HISTORY: right leg swelling and pain, pregnancy   TECHNOLOGIST PROVIDED HISTORY:   Reason for exam:->right leg swelling and pain, pregnancy       FINDINGS:   There is thrombus in the common femoral, deep femoral and proximal to distal   femoral veins as well as the greater saphenous vein junction. Impression   DVT in the common femoral, deep femoral and femoral veins as well as the   greater saphenous vein junction. Results were verbally communicated by the performing technologist to the   patient's nurse Claudia Zarco on 01/28/2023 at 9:10 p.m.      Luisito Green MD 1/29/2023 8:29 AM

## 2023-01-29 NOTE — CONSULTS
History and Physical      Name:  Jb Parker /Age/Sex: 2000  (23 y.o. female)   MRN & CSN:  [id number] & [id number] Encounter Date/Time: 2023 10:10 PM EST   Location:  [facility] PCP: Jenniefr Campbell [facility] Day: 1    Assessment and Plan:     Patient is a 82-year-old female  at 35 week 3 day pregnancy who presented with LLE pain x3 days. Patient seen in L&D. # Left femoral DVT  - Presented with worsening LLE pain and swelling x3 days. No SOB, CP, presyncope, syncope, palpitations or numbness. No previous episodes. Hx of JRA and as aunt with hypercoagulable state. - Mild leg swelling on exam, neurovascularly intact distally. US showing DVT of left FV and GSV.  - Will start on Heparin gtt and consult Cardiology to evaluate for possible thrombectomy. Labs, ECG and TTE ordered. Telemetry. # Juvenile RA  - Followed by Rheum outpatient, has been off DMARDs since beginning of pregnancy. Checklist:  Advanced directive: full  DVT ppx: Heparin    Disposition: patient requires continued admission due to left femoral DVT. MDM: high. History of Present Illness:     Chief Complaint: left swelling    Patient is a 82-year-old female with a PMHx of JRA, depression and IBS who presented to the ED with worsening LLE pain and swelling x3 days. No SOB, CP, presyncope, syncope, palpitations or numbness. No previous episodes. Hx of JRA and as aunt with hypercoagulable state. No fevers / chills, recent travel, surgeries or other complaints. ROS:     Ten point ROS reviewed negative, unless as noted above. Objective:     No intake or output data in the 24 hours ending 230     Vitals:   Vitals:    23 2200   BP: 133/63    Pulse: (!) 103    Resp: 18    Temp: 98.8 °F (37.1 °C)    TempSrc: Oral    SpO2: 99%    Weight:  233 lb 11 oz (106 kg)     BMI: Body mass index is 38.89 kg/m². General: Awake. WDWN. AAOx3. HEENT: PERRLA. Vision grossly intact. Hearing grossly intact. Oropharynx clear. Neck: Supple. No JVD. CV: Tachycardia. NL S1/S2. No murmurs. CR <2 secs. No peripheral edema. Mild LLE swelling, no color changes, warm-to-touch, distal pulses 2+, full sensation. Pulm: NL effort on RA. CTAB. CW NTTP. GI: Gravid, soft, NT.   : No CVA tenderness. No Watson catheter. Skin: Intact. Normal coloration, warm, dry. MSK: No gross joint deformities. Full ROM. Neuro: CNs grossly intact. Normal speech. No focal deficit. Psych: Good judgement and reason. Past History: PMHx:   Past Medical History:   Diagnosis Date    Anxiety     Depression     FH: juvenile rheumatoid arthritis     IBS (irritable bowel syndrome)     Migraine     Obesity     Urinary frequency        PSHx:   Past Surgical History:   Procedure Laterality Date    WISDOM TOOTH EXTRACTION         Allergies: Allergies   Allergen Reactions    Pcn [Penicillins] Hives       FHx: family history includes Heart Disease in her maternal grandmother; Mental Illness in her father and mother. SHx:   Social History     Socioeconomic History    Marital status: Single   Tobacco Use    Smoking status: Former    Smokeless tobacco: Never   Vaping Use    Vaping Use: Never used   Substance and Sexual Activity    Alcohol use: No    Drug use: No       Medications Prior to Admission     Prior to Admission medications    Medication Sig Start Date End Date Taking?  Authorizing Provider   Prenatal MV-Min-Fe Fum-FA-DHA (PRENATAL 1 PO) Take by mouth    Historical Provider, MD   Levothyroxine Sodium (LEVOTHROID PO) Take by mouth  Patient not taking: Reported on 1/10/2023    Historical Provider, MD   Hydroxychloroquine Sulfate (PLAQUENIL PO) Take by mouth  Patient not taking: Reported on 1/10/2023    Historical Provider, MD   FLUoxetine (PROZAC) 10 MG capsule Take 10 mg by mouth daily  Patient not taking: Reported on 1/10/2023    Historical Provider, MD   buPROPion (WELLBUTRIN XL) 150 MG extended release tablet Take 150 mg by mouth every morning  Patient not taking: Reported on 1/10/2023    Historical Provider, MD       Data:     CBC: No results for input(s): WBC, HGB, PLT, MCV, RDW, BANDSPCT, BLASTSPCT, METASPCT, LYMPHOPCT, PROMYELOPCT, MONOPCT, MYELOPCT, EOSPCT, BASOPCT, MONOSABS, LYMPHSABS, EOSABS, BASOSABS in the last 72 hours. Invalid input(s): SEGSPCTL, ATYLMREL  CMP:  No results for input(s): NA, K, CL, CO2, BUN, CREATININE, GFRAA, GLUCOSE, LABALBU, CALCIUM, BILITOT, ALKPHOS, AST, ALT in the last 72 hours. Lipids: No results found for: CHOL, HDL, TRIG  Hemoglobin A1C: No results found for: LABA1C  TSH: No results found for: TSH  Troponin: No results found for: TROPONINT  BNP: No results for input(s): PROBNP in the last 72 hours. Lactic Acid: No results for input(s): LACTA in the last 72 hours.   UA:  Lab Results   Component Value Date/Time    NITRU NEGATIVE 01/10/2023 10:55 AM    COLORU YELLOW 01/10/2023 10:55 AM    WBCUA 7 01/10/2023 10:55 AM    RBCUA 3 01/10/2023 10:55 AM    TRICHOMONAS NONE SEEN 01/10/2023 10:55 AM    BACTERIA OCCASIONAL 01/10/2023 10:55 AM    CLARITYU CLEAR 01/10/2023 10:55 AM    SPECGRAV <1.005 01/10/2023 10:55 AM    LEUKOCYTESUR MODERATE NUMBER OR AMOUNT OBSERVED 01/10/2023 10:55 AM    UROBILINOGEN 0.2 01/10/2023 10:55 AM    BILIRUBINUR NEGATIVE 01/10/2023 10:55 AM    BLOODU NEGATIVE 01/10/2023 10:55 AM    KETUA NEGATIVE 01/10/2023 10:55 AM     Urine Cultures: No results found for: LABURIN  Blood Cultures: No results found for: BC  No results found for: BLOODCULT2  Organism: No results found for: [facility]    Radiology results:  VL DUP LOWER EXTREMITY VENOUS LEFT    (Results Pending)       Medications:     Medications:    sodium chloride flush  5-40 mL IntraVENous 2 times per day    [START ON 1/29/2023] prenatal vitamin  1 tablet Oral Daily    heparin (porcine)  80 Units/kg IntraVENous Once        Infusions:    sodium chloride      heparin (PORCINE) Infusion         PRN Meds:   sodium chloride flush, 5-40 mL, PRN  sodium chloride, , PRN  ondansetron, 4 mg, Q8H PRN   Or  ondansetron, 4 mg, Q6H PRN  acetaminophen, 650 mg, Q4H PRN   Or  acetaminophen, 650 mg, Q4H PRN  hydrOXYzine pamoate, 50 mg, Nightly PRN  heparin (porcine), 80 Units/kg, PRN  heparin (porcine), 40 Units/kg, PRN        Esther Bennett MD  01/28/23 10:10 PM

## 2023-01-29 NOTE — CONSULTS
CARDIOLOGY CONSULT NOTE   Reason for consultation: Left leg DVT    Referring physician:  Tanvi Painter MD     Primary care physician: Geri Suggs      Dear   Thanks for the consult. History of present illness: Bennie Barnes is a 23 y. o.year old who  presents with left leg pain swelling and bluish discoloration she states may be pregnant ReSound showed left lower extremity DVT      Blood pressure, cholesterol, blood glucose and weight are well controlled. Past medical history:    has a past medical history of Anxiety, Depression, FH: juvenile rheumatoid arthritis, IBS (irritable bowel syndrome), Migraine, Obesity, and Urinary frequency. Past surgical history:   has a past surgical history that includes [place] tooth extraction. Social History:   reports that she has quit smoking. She has never used smokeless tobacco. She reports that she does not drink alcohol and does not use drugs.   Family history:   no family history of CAD, STROKE of DM    Allergies   Allergen Reactions    Pcn [Penicillins] Hives       sodium chloride flush 0.9 % injection 5-40 mL, 2 times per day  sodium chloride flush 0.9 % injection 5-40 mL, PRN  0.9 % sodium chloride infusion, PRN  ondansetron (ZOFRAN-ODT) disintegrating tablet 4 mg, Q8H PRN   Or  ondansetron (ZOFRAN) injection 4 mg, Q6H PRN  acetaminophen (TYLENOL) tablet 650 mg, Q4H PRN   Or  acetaminophen (TYLENOL) suppository 650 mg, Q4H PRN  prenatal vitamin 28-0.8 MG tablet 1 tablet, Daily  hydrOXYzine pamoate (VISTARIL) capsule 50 mg, Nightly PRN  heparin (porcine) injection 8,480 Units, PRN  heparin (porcine) injection 4,240 Units, PRN  heparin 25,000 units in dextrose 5% 250 mL (premix) infusion, Continuous      Current Facility-Administered Medications   Medication Dose Route Frequency Provider Last Rate Last Admin    sodium chloride flush 0.9 % injection 5-40 mL  5-40 mL IntraVENous 2 times per day Tanvi Painter MD        sodium chloride flush 0.9 % injection 5-40 mL  5-40 mL IntraVENous PRN Bjorn Larkin MD        0.9 % sodium chloride infusion   IntraVENous PRN Bjorn Larkin MD        ondansetron (ZOFRAN-ODT) disintegrating tablet 4 mg  4 mg Oral Q8H PRN Bjorn Larkin MD        Or    ondansetron [facility]) injection 4 mg  4 mg IntraVENous Q6H PRN Bjorn Larkin MD        acetaminophen (TYLENOL) tablet 650 mg  650 mg Oral Q4H PRN Bjorn Larkin MD   650 mg at 01/29/23 1542    Or    acetaminophen (TYLENOL) suppository 650 mg  650 mg Rectal Q4H PRN Bjorn Larkin MD        prenatal vitamin 28-0.8 MG tablet 1 tablet  1 tablet Oral Daily Bjorn Larkin MD   1 tablet at 01/29/23 0912    hydrOXYzine pamoate (VISTARIL) capsule 50 mg  50 mg Oral Nightly PRN Bjorn Larkin MD        heparin (porcine) injection 8,480 Units  80 Units/kg IntraVENous PRN Eze Francis MD        heparin (porcine) injection 4,240 Units  40 Units/kg IntraVENous PRN Eze Francis MD   4,240 Units at 01/29/23 1531    heparin 25,000 units in dextrose 5% 250 mL (premix) infusion  5-30 Units/kg/hr IntraVENous Continuous Eze Francis MD 24.4 mL/hr at 01/29/23 1532 23 Units/kg/hr at 01/29/23 1532     Review of Systems:   Constitutional: No Fever or Weight Loss   Eyes: No Decreased Vision  ENT: No Headaches, Hearing Loss or Vertigo  Cardiovascular: No chest pain, dyspnea on exertion, palpitations or loss of consciousness  Respiratory: No cough or wheezing    Gastrointestinal: No abdominal pain, appetite loss, blood in stools, constipation, diarrhea or heartburn  Genitourinary: No dysuria, trouble voiding, or hematuria  Musculoskeletal:  No gait disturbance, weakness or joint complaints  Integumentary: No rash or pruritis  Neurological: No TIA or stroke symptoms  Psychiatric: No anxiety or depression  Endocrine: No malaise, fatigue or temperature intolerance  Hematologic/Lymphatic: No bleeding problems, blood clots or swollen lymph nodes  Allergic/Immunologic: No nasal congestion or hives  All systems negative except as marked. Physical Examination:    Vitals:    01/29/23 1550   BP: 126/64   Pulse: 93   Resp: 20   Temp:    SpO2: 97%      Wt Readings from Last 3 Encounters:   01/28/23 233 lb 11 oz (106 kg)   01/10/23 235 lb (106.6 kg)   12/03/22 226 lb (102.5 kg)     Body mass index is 38.89 kg/m². General Appearance:  No distress, conversant    Constitutional:  Well developed, Well nourished, No acute distress, Non-toxic appearance. HENT:  Normocephalic, Atraumatic, Bilateral external ears normal, Oropharynx moist, No oral exudates, Nose normal. Neck- Normal range of motion, No tenderness, Supple, No stridor,no apical-carotid delay, no carotid bruit  Eyes:  PERRL, EOMI, Conjunctiva normal, No discharge. Respiratory:  Normal breath sounds, No respiratory distress, No wheezing, No chest tenderness. ,no use of accessory muscles, diaphragm movement is normal  Cardiovascular: (PMI) apex non displaced,no lifts no thrills, no s3,no s4, Normal heart rate, Normal rhythm, No murmurs, No rubs, No gallops. Carotid arteries pulse and amplitude are normal no bruit, no abdominal bruit noted ( normal abdominal aorta ausculation), femoral arteries pulse and amplitude are normal no bruit, pedal pulses are normal  GI:  Bowel sounds normal, Soft, No tenderness, No masses, No pulsatile masses, no hepatosplenomegally, no bruits  : External genitalia appear normal, No masses or lesions. No discharge. No CVA tenderness. Musculoskeletal:  Intact distal pulses, + edema, No tenderness, No cyanosis, No clubbing. Good range of motion in all major joints. No tenderness to palpation or major deformities noted. Back- No tenderness. Integument:  Warm, Dry, No erythema, No rash. Skin: no rash, no ulcers  Lymphatic:  No lymphadenopathy noted.    Neurologic:  Alert & oriented x 3, Normal motor function, Normal sensory function, No focal deficits noted. Psychiatric:  Affect normal, Judgment normal, Mood normal.   Lab Review   Recent Labs     01/29/23  0445   WBC 14.9*   HGB 9.5*   HCT 29.7*         Recent Labs     01/28/23 2216   *   K 4.0   CL 99   CO2 19*   BUN 8   CREATININE 0.4*     Recent Labs     01/28/23 2216   AST 9*   ALT 9*   BILITOT 0.5   ALKPHOS 141*     No results for input(s): TROPONINI in the last 72 hours. No results found for: BNP  Lab Results   Component Value Date    INR 1.07 01/28/2023    PROTIME 13.8 01/28/2023         EKG:  normal sinus rhythm    Chest Xray:    ECHO: Pending  Labs, echo, meds reviewed  Assessment: 22 y. o.year old with PMH of  has a past medical history of Anxiety, Depression, FH: juvenile rheumatoid arthritis, IBS (irritable bowel syndrome), Migraine, Obesity, and Urinary frequency. Recommendations:    Left leg DVT, venous Doppler result was reviewed patient had left common femoral vein and superficial femoral vein DVT, however with IV heparin her bluish discoloration pain is completely resolved because of 33-week pregnancy will not perform thrombectomy because it requires patient to lay down on her belly and has large extra exposure no chest pain and will get an echo to just rule out any evidence of a PE  All labs, medications and tests reviewed, continue all other medications of all above medical condition listed as is.          Janina Cooley MD, 1/29/2023 4:34 PM

## 2023-01-29 NOTE — H&P
Department of Obstetrics and Gynecology   Obstetrics History and Physical        CHIEF COMPLAINT:  left leg pain and swelling. HISTORY OF PRESENT ILLNESS:      The patient is a 23 y.o. female at 33w3d who presents with left leg pain and swelling which began days prior (Thursday). Pain has increasing pain and swelling since Thursday. She reports active fetal movement. She denies vaginal bleeding. She denies contraction. She denies leakage of fluid    OB History          2    Para        Term                AB   1    Living             SAB        IAB        Ectopic        Molar        Multiple        Live Births                Patient presents with a chief complaint as above and is being admitted for left femoral vein DVT.        Estimated Due Date: Estimated Date of Delivery: 3/15/23    PRENATAL CARE:    Complicated by: none    PAST OB HISTORY  OB History          2    Para        Term                AB   1    Living             SAB        IAB        Ectopic        Molar        Multiple        Live Births                    Past Medical History:    Juvenile Rheumatoid arthritis for which she sees Dr. Alannah King (unsure of meds prior to pregnancy)      Diagnosis Date    Anxiety     Depression     IBS (irritable bowel syndrome)     Migraine     Obesity     Urinary frequency      Past Surgical History:        Procedure Laterality Date    WISDOM TOOTH EXTRACTION       Allergies:  Pcn [penicillins]  Social History:    Social History     Socioeconomic History    Marital status: Single     Spouse name: Not on file    Number of children: Not on file    Years of education: Not on file    Highest education level: Not on file   Occupational History    Not on file   Tobacco Use    Smoking status: Former    Smokeless tobacco: Never   Vaping Use    Vaping Use: Never used   Substance and Sexual Activity    Alcohol use: No    Drug use: No    Sexual activity: Not on file   Other Topics Concern Not on file   Social History Narrative    Not on file     Social Determinants of Health     Financial Resource Strain: Not on file   Food Insecurity: Not on file   Transportation Needs: Not on file   Physical Activity: Not on file   Stress: Not on file   Social Connections: Not on file   Intimate Partner Violence: Not on file   Housing Stability: Not on file     Family History:       Problem Relation Age of Onset    Heart Disease Maternal Grandmother     Mental Illness Father     Mental Illness Mother      Medications Prior to Admission:  Medications Prior to Admission: Prenatal MV-Min-Fe Fum-FA-DHA (PRENATAL 1 PO), Take by mouth  Levothyroxine Sodium (LEVOTHROID PO), Take by mouth (Patient not taking: Reported on 1/10/2023)  Hydroxychloroquine Sulfate (PLAQUENIL PO), Take by mouth (Patient not taking: Reported on 1/10/2023)  FLUoxetine (PROZAC) 10 MG capsule, Take 10 mg by mouth daily (Patient not taking: Reported on 1/10/2023)  buPROPion (WELLBUTRIN XL) 150 MG extended release tablet, Take 150 mg by mouth every morning (Patient not taking: Reported on 1/10/2023)    REVIEW OF SYSTEMS:    CONSTITUTIONAL:  negative  RESPIRATORY:  negative  CARDIOVASCULAR:  negative  GASTROINTESTINAL:  negative  ALLERGIC/IMMUNOLOGIC:  negative  NEUROLOGICAL:  negative  BEHAVIOR/PSYCH:  negative    PHYSICAL EXAM:  Blood pressure 133/63, pulse (!) 103, temperature 98.8 °F (37.1 °C), temperature source Oral, resp. rate 18, last menstrual period 01/14/2022, SpO2 99 %, not currently breastfeeding. General appearance:  awake, alert, cooperative, no apparent distress, and appears stated age  Neurologic:  Awake, alert, oriented to name, place and time.     Lungs:  No increased work of breathing, good air exchange  Abdomen:  Soft, non tender, gravid, consistent with her gestational age  Fetal heart rate:    Baseline Heart Rate:  135        Accelerations:  present        Variability:  moderate       Decelerations:  absent       Pelvis:  entire leg swollen, slightly discolored    Contraction frequency:  none    Membranes:  Intact    ASSESSMENT AND PLAN:  IUP at 33w 3d with left femoral vein DVT  -consult hospitalist  -twice daily NST  -appreciate hospitalist input and seeing the patient

## 2023-01-30 VITALS
SYSTOLIC BLOOD PRESSURE: 121 MMHG | OXYGEN SATURATION: 97 % | WEIGHT: 233.69 LBS | RESPIRATION RATE: 20 BRPM | HEART RATE: 83 BPM | BODY MASS INDEX: 38.89 KG/M2 | DIASTOLIC BLOOD PRESSURE: 64 MMHG | TEMPERATURE: 97.8 F

## 2023-01-30 LAB — APTT: 84 SECONDS (ref 25.1–37.1)

## 2023-01-30 PROCEDURE — 2580000003 HC RX 258: Performed by: OBSTETRICS & GYNECOLOGY

## 2023-01-30 PROCEDURE — 6360000002 HC RX W HCPCS: Performed by: INTERNAL MEDICINE

## 2023-01-30 PROCEDURE — 93306 TTE W/DOPPLER COMPLETE: CPT

## 2023-01-30 PROCEDURE — 6370000000 HC RX 637 (ALT 250 FOR IP): Performed by: OBSTETRICS & GYNECOLOGY

## 2023-01-30 PROCEDURE — 85730 THROMBOPLASTIN TIME PARTIAL: CPT

## 2023-01-30 RX ORDER — ENOXAPARIN SODIUM 150 MG/ML
1 INJECTION SUBCUTANEOUS EVERY 12 HOURS
Qty: 43.8 ML | Refills: 1 | Status: SHIPPED | OUTPATIENT
Start: 2023-01-30 | End: 2023-03-31

## 2023-01-30 RX ORDER — ENOXAPARIN SODIUM 150 MG/ML
1 INJECTION SUBCUTANEOUS 2 TIMES DAILY
Status: DISCONTINUED | OUTPATIENT
Start: 2023-01-30 | End: 2023-01-30 | Stop reason: HOSPADM

## 2023-01-30 RX ADMIN — Medication 10 ML: at 10:00

## 2023-01-30 RX ADMIN — ENOXAPARIN SODIUM 105 MG: 150 INJECTION SUBCUTANEOUS at 09:57

## 2023-01-30 RX ADMIN — Medication 1 TABLET: at 09:59

## 2023-01-30 RX ADMIN — ACETAMINOPHEN 650 MG: 325 TABLET ORAL at 09:58

## 2023-01-30 ASSESSMENT — ENCOUNTER SYMPTOMS
BACK PAIN: 0
RHINORRHEA: 0
EYE PAIN: 0
BLOOD IN STOOL: 0
ABDOMINAL PAIN: 0
VOICE CHANGE: 0
ABDOMINAL DISTENTION: 0
NAUSEA: 0
CHEST TIGHTNESS: 0
PHOTOPHOBIA: 0
SHORTNESS OF BREATH: 0
EYE ITCHING: 0
EYE DISCHARGE: 0
CONSTIPATION: 0
VOMITING: 0
SINUS PAIN: 0
COUGH: 0
SORE THROAT: 0
EYE REDNESS: 0
TROUBLE SWALLOWING: 0
DIARRHEA: 0

## 2023-01-30 ASSESSMENT — PAIN DESCRIPTION - ORIENTATION: ORIENTATION: LEFT

## 2023-01-30 ASSESSMENT — PAIN SCALES - WONG BAKER
WONGBAKER_NUMERICALRESPONSE: 0

## 2023-01-30 ASSESSMENT — PAIN - FUNCTIONAL ASSESSMENT: PAIN_FUNCTIONAL_ASSESSMENT: PREVENTS OR INTERFERES SOME ACTIVE ACTIVITIES AND ADLS

## 2023-01-30 ASSESSMENT — PAIN SCALES - GENERAL
PAINLEVEL_OUTOF10: 6
PAINLEVEL_OUTOF10: 5

## 2023-01-30 ASSESSMENT — PAIN DESCRIPTION - DESCRIPTORS: DESCRIPTORS: CRAMPING

## 2023-01-30 ASSESSMENT — PAIN DESCRIPTION - LOCATION: LOCATION: LEG

## 2023-01-30 NOTE — PROGRESS NOTES
In-Patient Progress Note    Patient: Shelbie Orlando 23 y.o. female MRN: [id number]     Date of Service: 2023    Hospital Day: 3      Chief complaint: had no chief complaint listed for this encounter. Assessment and Plan   Savana Anand, a 23 y.o. female, , with a history of Juvenile RA, currently on third trimester 33W 3days, was admitted on 2023 with complaints of LLE pain x 3 days. Assessment and plan    # Left femoral DVT  Presented with worsening LLE pain and swelling x3 days. No SOB, CP, presyncope, syncope, palpitations or numbness. No previous episodes. Hx of JRA and as aunt with hypercoagulable state. Mild leg swelling on exam, neurovascularly intact distally. US showing DVT of left FV and GSV. Was started on Heparin gtt on admission  -Cardiology was consulted to evaluate for possible thrombectomy. Significant improvement compared to chart. No indication of thrombectomy per cardiology at this time. They will obtain an Echo to rule out any concern for PE. -Change to enoxaparin 1mg/kg twice daily  -Can consider using anti-factor Xa level to monitor enoxaparin  -Avoid delivery on enoxaparin- needs to be held at least 24 hours prior to delivery or changed to  unfractionated heparin at 36-37 weeks.  -As long as there is no significant bleeding associated with delivery, needs to be restarted ? 4 to 6 hours after vaginal delivery or ? 6 to 12 hours after  delivery.  -Okay with discharge if echo is within normal limits. # Juvenile RA  - Followed by Rheum outpatient, has been off DMARDs since beginning of pregnancy. # Peptic ulcer prophylaxis: -  # DVT Prophylaxis: heparin drip  #CODE STATUS: full code      Current living situation: home  Expected Disposition: home  Estimated discharge date: 1-2 days      Review of System     Review of Systems   Constitutional:  Negative for activity change, appetite change, chills, fatigue and fever.    HENT:  Negative for congestion, ear discharge, ear pain, hearing loss, nosebleeds, postnasal drip, rhinorrhea, sinus pain, sore throat, trouble swallowing and voice change. Eyes:  Negative for photophobia, pain, discharge, redness and itching. Respiratory:  Negative for cough, chest tightness and shortness of breath. Cardiovascular:  Positive for leg swelling (left). Negative for chest pain and palpitations. Gastrointestinal:  Negative for abdominal distention, abdominal pain, blood in stool, constipation, diarrhea, nausea and vomiting. Endocrine: Negative for cold intolerance, heat intolerance, polydipsia, polyphagia and polyuria. Genitourinary:  Negative for difficulty urinating, dysuria, flank pain, frequency, hematuria and urgency. Musculoskeletal:  Negative for arthralgias, back pain, gait problem, joint swelling and myalgias. Skin:  Negative for pallor, rash and wound. Allergic/Immunologic: Negative for environmental allergies and food allergies. Neurological:  Negative for dizziness, tremors, seizures, syncope, speech difficulty, weakness, light-headedness, numbness and headaches. Hematological:  Negative for adenopathy. Does not bruise/bleed easily. Psychiatric/Behavioral:  Negative for agitation, confusion, decreased concentration, hallucinations, self-injury, sleep disturbance and suicidal ideas. The patient is not nervous/anxious and is not hyperactive. I have reviewed all pertinent PMHx, PSHx, FamHx, SocialHx, medications, and allergies and updated history as appropriate.     Physical Exam   VITAL SIGNS:  /64   Pulse 83   Temp 97.8 °F (36.6 °C) (Oral)   Resp 20   Wt 233 lb 11 oz (106 kg)   LMP 2022   SpO2 97%   BMI 38.89 kg/m²   Tmax over 24 hours:  Temp (24hrs), Av °F (36.7 °C), Min:97.6 °F (36.4 °C), Max:98.5 °F (36.9 °C)      Patient Vitals for the past 6 hrs:   BP Temp Temp src Pulse Resp SpO2   23 0750 121/64 97.8 °F (36.6 °C) Oral 83 20 97 %   23 0600 -- -- -- 86 -- 97 %   01/30/23 0500 132/84 98.2 °F (36.8 °C) Oral 86 17 98 %   01/30/23 0400 -- -- -- 86 -- 97 %   01/30/23 0300 -- -- -- 87 -- 97 %         No intake or output data in the 24 hours ending 01/30/23 0819  Wt Readings from Last 2 Encounters:   01/28/23 233 lb 11 oz (106 kg)   01/10/23 235 lb (106.6 kg)     Body mass index is 38.89 kg/m². Physical Exam  Constitutional:       General: She is not in acute distress. Appearance: She is well-developed. HENT:      Head: Normocephalic and atraumatic. Right Ear: External ear normal.      Left Ear: External ear normal.      Nose: Nose normal.   Eyes:      General: No scleral icterus. Right eye: No discharge. Left eye: No discharge. Conjunctiva/sclera: Conjunctivae normal.      Pupils: Pupils are equal, round, and reactive to light. Neck:      Thyroid: No thyromegaly. Vascular: No JVD. Trachea: No tracheal deviation. Cardiovascular:      Rate and Rhythm: Normal rate and regular rhythm. Heart sounds: Normal heart sounds. No murmur heard. No friction rub. No gallop. Pulmonary:      Effort: Pulmonary effort is normal. No respiratory distress. Breath sounds: Normal breath sounds. No stridor. No wheezing or rales. Chest:      Chest wall: No tenderness. Abdominal:      General: Bowel sounds are normal. There is no distension. Palpations: Abdomen is soft. There is no mass. Tenderness: There is no abdominal tenderness. There is no guarding or rebound. Hernia: No hernia is present. Genitourinary:     Vagina: Normal. No vaginal discharge. Rectum: Guaiac result negative. Musculoskeletal:         General: No tenderness or deformity. Normal range of motion. Cervical back: Normal range of motion and neck supple. Left lower leg: Edema (mild edema and erythema) present. Lymphadenopathy:      Cervical: No cervical adenopathy. Skin:     General: Skin is warm and dry.       Capillary Refill: Capillary refill takes less than 2 seconds. Coloration: Skin is not pale. Findings: No erythema or rash. Neurological:      Mental Status: She is alert and oriented to person, place, and time. Cranial Nerves: No cranial nerve deficit. Motor: No abnormal muscle tone. Coordination: Coordination normal.      Deep Tendon Reflexes: Reflexes normal.   Psychiatric:         Behavior: Behavior normal.         Thought Content: Thought content normal.         Judgment: Judgment normal.         Current Medications      enoxaparin  1 mg/kg SubCUTAneous BID    sodium chloride flush  5-40 mL IntraVENous 2 times per day    prenatal vitamin  1 tablet Oral Daily         Labs and Imaging Studies   Laboratory findings:  US OB 14 PLUS WEEKS SINGLE OR FIRST GESTATION    Result Date: 1/29/2023  EXAMINATION: TRANSABDOMINAL SECOND/THIRD TRIMESTER OBSTETRIC PELVIC ULTRASOUND WITH COLOR DOPPLER FLOW 1/28/2023 10:39 pm TECHNIQUE: TRANSABDOMINAL PELVIC ULTRASOUND WITH COLOR DOPPLER FLOW COMPARISON: None. HISTORY: ORDERING SYSTEM PROVIDED HISTORY: pregnancy with dvt for fetal growth and fluid TECHNOLOGIST PROVIDED HISTORY: Reason for exam:->pregnancy with dvt for fetal growth and fluid FINDINGS: GENERAL OBSERVATIONS: PREGNANCY:   Single CARDIAC ACTIVITY:  Yes FETAL HEART RATE: 132 FETAL BODY & LIMB MOVEMENTS:  Yes FETAL POSITION:  Cephalic PLACENTA LOCATION:  Posterior MARY:   17.1 cm FETAL ANATOMY: Fetal anatomy survey was not performed. ESTIMATED FETAL AGE: BY LMP:  33 weeks, 3 days CURRENT US:  32 weeks, 5 days ESTIMATED FETAL WEIGHT:   2092 grams MEASUREMENTS: BPD: 8.12 cm, HEAD CIRCUMFERENCE:  30.3 cm, ABD. CIRCUMFERENCE:  28.7 cm FEMUR LENGTH:  6.45 cm CERVICAL LENGTH:  Not measured. A single live intrauterine pregnancy with estimated gestational age of 26 weeks, 5 days by ultrasound. The estimated fetal weight is 2092 grams. MARY measures 17.1 cm.      VL DUP LOWER EXTREMITY VENOUS LEFT    Result Date: 1/28/2023  EXAMINATION: DUPLEX VENOUS ULTRASOUND OF THE LEFT LOWER EXTREMITY 1/28/2023 9:04 pm TECHNIQUE: Duplex ultrasound using B-mode/gray scaled imaging and Doppler spectral analysis and color flow was obtained of the deep venous structures of the left extremity. COMPARISON: None. HISTORY: ORDERING SYSTEM PROVIDED HISTORY: right leg swelling and pain, pregnancy TECHNOLOGIST PROVIDED HISTORY: Reason for exam:->right leg swelling and pain, pregnancy FINDINGS: There is thrombus in the common femoral, deep femoral and proximal to distal femoral veins as well as the greater saphenous vein junction. DVT in the common femoral, deep femoral and femoral veins as well as the greater saphenous vein junction. Results were verbally communicated by the performing technologist to the patient's nurse Rhona Cowan on 01/28/2023 at 9:10 p.m. Recent Results (from the past 24 hour(s))   APTT    Collection Time: 01/29/23  1:58 PM   Result Value Ref Range    aPTT 37.9 (H) 25.1 - 37.1 SECONDS   APTT    Collection Time: 01/29/23  9:26 PM   Result Value Ref Range    aPTT 34.1 25.1 - 37.1 SECONDS   APTT    Collection Time: 01/30/23  5:16 AM   Result Value Ref Range    aPTT 84.0 (H) 25.1 - 37.1 SECONDS           Electronically signed by Ryan Nolan MD on 1/30/2023 at 8:19 AM      Comment: Please note this report has been produced using speech recognition software and may contain errors related to that system including errors in grammar, punctuation, and spelling, as well as words and phrases that may be inappropriate. If there are any questions or concerns please feel free to contact the dictating provider for clarification.

## 2023-01-30 NOTE — PROGRESS NOTES
SUBJECTIVE:    Pt feeling ok, just restless ; has questions about labor and induction with current DVT. +GFM, no ob complaints     OBJECTIVE    Vitals:  /64   Pulse 83   Temp 97.8 °F (36.6 °C) (Oral)   Resp 20   Wt 233 lb 11 oz (106 kg)   LMP 2022   SpO2 97%   BMI 38.89 kg/m²     CONSTITUTIONAL:  awake, alert, cooperative, no apparent distress, and appears stated age  ABDOMEN:  No scars, normal bowel sounds, soft, non-distended, non-tender, no masses palpated, no hepatosplenomegally  DATA:      ASSESSMENT:    23y.o.  year old  at 33w5d  with 3/15/2023, by Other Basis; with current DVT         PLAN:  Stable from OB standpoint. Ok to use Lovenox -  1mg/kg is typically what we use as treatment. We will stop for approx 24h prior to labor to ensure epidural/spinal anesthesia is available. Alternatively, sometimes we change to heparin due to shorter half life. Will refer pt as outpatient to [facility] for one eval to discuss those options for labor management. Pt aware. We are OK with discharge whenever IM feels she is stable for that. OK to return to work. Discussed that we plan IOL around 39 weeks if DVT is only complication of pregnancy.

## 2023-01-30 NOTE — CARE COORDINATION
Student reviewed pt chart. Pt is 23years old and is 33 weeks pregnant. Pt lives at home with Significant Other /Spouse. Pt has PCP and insurance to help with healthcare costs. Pt does not have any DME reported. No CM needs identified at this time. Plan is to return home with S/O. CM is available if needed.

## 2023-01-30 NOTE — DISCHARGE SUMMARY
Obstetric Discharge [facility]    Patient Name:    Savana Anand    Medical Record Number:   [id number]    Attending:     Roxie Cervantes MD    Date of Admission:    2023    Date of Discharge:      23      Admitting Diagnosis  IUP 33 weeks, New LLE DVT  OB History          2    Para        Term                AB   1    Living             SAB        IAB        Ectopic        Molar        Multiple        Live Births                    Reasons for Admission on 2023  8:04 PM  Leg swelling in pregnancy, third trimester [G02.65]  DVT complicating pregnancy, third trimester [O22.33]  No comment available        Discharge Diagnosis   same    Discharge Labs:      Discharge Meds:       Medication List        START taking these medications      enoxaparin 120 MG/0.8ML injection  Commonly known as: LOVENOX  Inject 0.73 mLs into the skin in the morning and 0.73 mLs in the evening. CONTINUE taking these medications      LEVOTHROID PO     PRENATAL 1 PO            STOP taking these medications      buPROPion 150 MG extended release tablet  Commonly known as: WELLBUTRIN XL     FLUoxetine 10 MG capsule  Commonly known as: PROZAC     PLAQUENIL PO               Where to Get Your Medications        These medications were sent to [address] [place] [address] [phone]  [address] [place] [address] [address] [place] [address]      Phone: [phone]   enoxaparin 120 MG/0.8ML injection         Discharge Information  Current Discharge Medication List        START taking these medications    Details   enoxaparin (LOVENOX) 120 MG/0.8ML injection Inject 0.73 mLs into the skin in the morning and 0.73 mLs in the evening.   Qty: 43.8 mL, Refills: 1    Associated Diagnoses: DVT (deep vein thrombosis) in pregnancy           CONTINUE these medications which have NOT CHANGED    Details   Prenatal MV-Min-Fe Fum-FA-DHA (PRENATAL 1 PO) Take by mouth Levothyroxine Sodium (LEVOTHROID PO) Take by mouth           STOP taking these medications       Hydroxychloroquine Sulfate (PLAQUENIL PO) Comments:   Reason for Stopping:         FLUoxetine (PROZAC) 10 MG capsule Comments:   Reason for Stopping:         buPROPion (WELLBUTRIN XL) 150 MG extended release tablet Comments:   Reason for Stopping:               Course: patient was admitted on 1/28/23; placed on anticoagulation with Heparin qtt; cardiology was consulted. They declined thrombectomy. Echo done today;. She is now changed to therapeutic enoxaparin at 1mg/kg/dose q12h. She is stable for d/c home. She is to follow up 2/6/23 at our office and on 2/16/23 at 10am in L&D with [facility] for consult.      Discharge to: Home  Follow up in as above

## 2023-01-31 LAB
EKG ATRIAL RATE: 96 BPM
EKG DIAGNOSIS: NORMAL
EKG P AXIS: 33 DEGREES
EKG P-R INTERVAL: 122 MS
EKG Q-T INTERVAL: 366 MS
EKG QRS DURATION: 72 MS
EKG QTC CALCULATION (BAZETT): 462 MS
EKG R AXIS: 33 DEGREES
EKG T AXIS: 37 DEGREES
EKG VENTRICULAR RATE: 96 BPM

## 2023-01-31 PROCEDURE — 93010 ELECTROCARDIOGRAM REPORT: CPT | Performed by: INTERNAL MEDICINE

## 2023-02-16 ENCOUNTER — HOSPITAL ENCOUNTER (OUTPATIENT)
Dept: ULTRASOUND IMAGING | Age: 23
Discharge: HOME | End: 2023-02-16
Payer: COMMERCIAL

## 2023-02-16 ENCOUNTER — HOSPITAL ENCOUNTER (OUTPATIENT)
Age: 23
Discharge: HOME | End: 2023-02-16
Attending: OBSTETRICS & GYNECOLOGY | Admitting: OBSTETRICS & GYNECOLOGY
Payer: COMMERCIAL

## 2023-02-16 VITALS
HEART RATE: 85 BPM | SYSTOLIC BLOOD PRESSURE: 138 MMHG | RESPIRATION RATE: 18 BRPM | HEART RATE: 85 BPM | DIASTOLIC BLOOD PRESSURE: 85 MMHG | DIASTOLIC BLOOD PRESSURE: 85 MMHG | SYSTOLIC BLOOD PRESSURE: 138 MMHG | RESPIRATION RATE: 18 BRPM | TEMPERATURE: 98.3 F | TEMPERATURE: 98.3 F

## 2023-02-16 VITALS
HEIGHT: 65 IN | HEART RATE: 77 BPM | TEMPERATURE: 97.8 F | OXYGEN SATURATION: 98 % | DIASTOLIC BLOOD PRESSURE: 78 MMHG | WEIGHT: 248.6 LBS | RESPIRATION RATE: 18 BRPM | SYSTOLIC BLOOD PRESSURE: 131 MMHG | BODY MASS INDEX: 41.42 KG/M2

## 2023-02-16 DIAGNOSIS — O36.5930 POOR FETAL GROWTH AFFECTING MANAGEMENT OF MOTHER IN THIRD TRIMESTER, SINGLE OR UNSPECIFIED FETUS: ICD-10-CM

## 2023-02-16 DIAGNOSIS — Z36.89 ENCOUNTER FOR FETAL ANATOMIC SURVEY: ICD-10-CM

## 2023-02-16 LAB — GBS, EXTERNAL RESULT: POSITIVE

## 2023-02-16 PROCEDURE — 59025 FETAL NON-STRESS TEST: CPT

## 2023-02-16 PROCEDURE — 87077 CULTURE AEROBIC IDENTIFY: CPT

## 2023-02-16 PROCEDURE — 76811 OB US DETAILED SNGL FETUS: CPT

## 2023-02-16 PROCEDURE — 87081 CULTURE SCREEN ONLY: CPT

## 2023-02-16 PROCEDURE — 76819 FETAL BIOPHYS PROFIL W/O NST: CPT

## 2023-02-16 PROCEDURE — 76820 UMBILICAL ARTERY ECHO: CPT

## 2023-02-16 PROCEDURE — 99211 OFF/OP EST MAY X REQ PHY/QHP: CPT

## 2023-02-16 NOTE — DISCHARGE INSTRUCTIONS
OB DISCHARGE INSTRUCTIONS    Discharge Disposition:Home, May take Tylenol for pain.  Labor  Regular tightening of the uterus coming as often as once every 15 minutes. Menstrual-like cramps, they may be regular, or may be continuous and move to your back. A low, dull backache different from what you normally experience. It may come and go. Vaginal leaking of fluid. Any bleeding from your vagina. Pain, burning or bleeding when you urinate. Labor  Call your doctor; or come to the hospital, if you have:  Contractions coming about every 3-5 minutes, for about one hour. Labor contractions are usually strong enough that you cannot walk or talk while having contractions. (Contractions can feel like menstrual cramps, tightening in your abdomen, rectal pressure or a backache. This feeling comes and goes.)   Vaginal leaking of fluid. Heavy, bright red bleeding, like the heavy days of your period.  (A little bloody show is normal in labor.)     Always call your Doctor if you:  Notice decreased movement of your baby, different from what you are used to. Have heavy, bright red bleeding, like the heavy days of your period. Have vaginal leaking of watery fluid. Your next appointment:Keep your next appointment and return to L & D if needed. Activity:  AS TOLERATED  Diet:  REGULAR  If you have any questions regarding your discharge instructions call us at [phone]    .

## 2023-02-16 NOTE — FLOWSHEET NOTE
All discharge instructions reviewed with patient. All follow-up appointments discussed in detail. All questions answered. Pt verbalized understanding. Pt off unit ambulatory with no signs of distress.

## 2023-02-16 NOTE — CONSULTS
New [facility] Obstetric Visit    HISTORY:    This 23 y.o. Mirian Queen whose Estimated Date of Delivery: 3/15/23 and whose  EGA is 36w1d presents for her first [facility] prenatal visit. Her gestational age has been confirmed by LMP consistent with ultrasound. An incidental finding today is fetal growth restriction with a fetus measuring in the 9% for gestational age and an abdominal circumference in the 4%. Umbilical artery Doppler was within normal limits for gestational age. I explained the epidemiologic diagnosis of FGR to Miya Ames and how the Doppler assists in reassuring us regarding placental blood flow. Given her gestational age, a f/u UA Doppler does not need to be preformed in lieu of delivery at 38 weeks. This recommendation was discussed with Dr Radha Bardales by telephone. She presents to our high risk practice because of LLE DVT in pregnancy on Lovenox. This was diagnosed  without additional precipitating event other than pregnancy. See US report. BPP 8/10. Reactive NST    _____________________________________________________________________    Obstetric History:   OB History    Para Term  AB Living   2       1     SAB IAB Ectopic Molar Multiple Live Births                    # Outcome Date GA Lbr Abhijit/2nd Weight Sex Delivery Anes PTL Lv   2 Current            1 AB 2022               Past Medical History:  Pt  has a past medical history of Anxiety, Depression, DVT (deep vein thrombosis) in pregnancy, FH: juvenile rheumatoid arthritis, IBS (irritable bowel syndrome), Migraine, Obesity, and Urinary frequency. Past Surgical History:  Pt  has a past surgical history that includes [place] tooth extraction. Social History:  Pt  reports that she has quit smoking. She has never used smokeless tobacco. She reports that she does not drink alcohol and does not use drugs.     Family History:  Pt's family history includes Heart Disease in her maternal grandmother; Mental Illness in her father and mother. Allergies:  Pcn [penicillins]    Current Medications:  PNV and Lovenox 120mg daily    Review of Systems:    No fever or chills. No symptoms of depression. No headache. No blurred vision. No dizziness. No nausea or vomiting. No dyspepsia. No palpitations. No dysphagia. No dyspnea. No coughing or wheezing. No backache. No abdominal cramping. No contractions. No dysuria. No bowel dysfunction. No leg pain or cramps. No vaginal bleeding. No unusual vaginal discharge.    ________________________________________________________________________  Physical Exam:  VITAL SIGNS: /78   Pulse 77   Temp 97.8 °F (36.6 °C) (Oral)   Resp 18   Ht 5' 5\" (1.651 m)   Wt 248 lb 9.6 oz (112.8 kg)   LMP 01/14/2022   SpO2 98%   BMI 41.37 kg/m²   BODY MASS INDEX: Body mass index is 41.37 kg/m². GENERAL APPEARANCE: Alert, oriented, and in no apparent distress. ________________________________________________________________________    Impression:  1. Intrauterine pregnancy at 36w1d  2. Fetal growth restriction with reassuring testing   3.  DVT diagnosed JAN/2023 on lovenox  ________________________________________________________________________  Extended Problem List and Plan  Patient Active Problem List    Diagnosis Date Noted    Supervision of high risk pregnancy in third trimester 01/29/2023    33 weeks gestation of pregnancy 01/29/2023    Obesity affecting pregnancy in third trimester 01/29/2023    Leg swelling in pregnancy, third trimester 01/28/2023    DVT (deep vein thrombosis) in pregnancy 01/28/2023    Labor and delivery indication for care or intervention 12/03/2022     ________________________________________________________________________  Brief Plan:  Weekly nonstress tests until delivery  Delivery at 38 weeks if undelivered for non-reassuring testing  May take AM dose of Lovenox the day before scheduled induction but hold the evening dose  If signs/symptoms of labor occur prior to induction, hold Lovenox dose  Restart Lovenox 8h after  and 12h after CD  Lovenox is compatible with breastfeeding  Full thrombophilia work-up is recommended when Ana Reza is no longer on anticoagulation or pregnant

## 2023-02-16 NOTE — FLOWSHEET NOTE
RN discusses reactive NST, GBS collection, and plan of care with Dr. Sebastian Pereira and multiple follow-up appointments discussed. RN will notify pt of follow-ups.

## 2023-02-16 NOTE — FLOWSHEET NOTE
Pt discharged from [facility], pt to be admitted as outpatient in bed for NST. Pt aware. Pt moved LT05 in stable condition for further testing.

## 2023-02-16 NOTE — PROCEDURES
[facility] Ultrasound Report    Patient name: Gilford Harman Age: 23 y.o. : 2000     Date of Service: [id number]    SUE: Estimated Date of Delivery: 3/15/23 Gestational Age: 36w1d OB Hx:     Indication: LLE DVT on Lovenox    Scan type: Anatomy/Level 2    Fetus #: 1     Presentation: Cephalic  Fetal Heart Rate: 163    Placental location: Posterior  Amniotic fluid: Normal   P. Cord Insertion: non-visualized  Single Deepest Pocket: 5.1cm    Biometry:  Biparietal Diameter    89.2mm 36w1d  59%  Head Circumference   313.8mm 35w1d  6%  Abdominal Circumference  295.3mm         33w4d  4%  Femur Length    66.8mm 34w3d  9%  Humerus Length   57.6mm 33w3d  Lateral Ventricle   4.4mm  Ulna     54.8mm 34w1d  Radius     47.2mm 32w2d  40%  Tibia     59.8mm 34w5d  Fibula      54.7mm 35w5d  38%    Fetal weight:    2369g  9%    Anatomy:      Head  Cranium:  Visualized  Intracranial Kathy: Visualized  Fetal Cavum:  Limited  Lateral Ventricles:  Visualized  Choroid Plexus:  Visualized  Cerebellum:   Non-Visualized  Midline Falx:   Visualized  PMT/AR:   Non-Visualized  Midface:   Non-Visualized  Profile:   Non-Visualized  Orbits/Eyes:  NV lenses      Thorax  4 Chamber Heart: Limited  RVOT:   Visualized  LVOT:    Limited  3VV:    Visualized  3VTV:    Visualized  Aortic Arch:    Non-Visualized  Ductal Arch:    Non-Visualized  SVC/IVC:    Visualized  Crossing:  Non-Visualized  Cardiac Rhythm: Normal  Cardiac Axis:   Normal  Cardiac Situs:   Normal        Spine  Cervical  Visualized  Thoracic  Visualized  Lumbar  Limited  Sacral   Limited        Abdomen  Diaphragm  Visualized  Stomach   Visualized  Bowel   Visualized  Bladder   Visualized  Abd. Cord Insertion     Non-Visualized  Ventral Wall  Non-Visualized  Rt. Kidney  Visualized  Lt.  Kidney  Visualized  Renal Arteries  Non-Visualized  Umbilical Cord  3VC        Extremities  Humerus - Left Non-Visualized  Humerus - Right Visualized  Forearm - Left  Non-Visualized  Forearm - Right  Visualized  Hand - Left  Closed  Hand - Right  Closed  Femur - Left  Visualized  Femur - Right  Limited  Lower Leg - Left Visualized  Lower Leg - Right Limited  Feet   Limited/    Genitalia:    Female        Other  BPP is    6/8 but 8/10 with reactive NST  UA Doppler   wnl  MCA Doppler   N/a        Thank you for sending your patient for an detailed anatomy scan. She is seen for DVT in pregnancy but FGR was diagnosed. Anatomy was unremarkable but limited by gestational age, fetal position and maternal habitus. No f/u scheduled. Please see consult note for details.      Gisella Sutherland MD  [facility]

## 2023-02-16 NOTE — FLOWSHEET NOTE
Pt presents to Labor & Delivery ambulatory with a steady gait. Pt on unit for US appointment with [facility] this morning. M recommends NST for new diagnosis of IUGR. Pt denies any vaginal bleeding, leaking of fluid, abdominal pain or tenderness. Pt states cramping intermittently. Pt states feeling fetal movement. Pt oriented to room. Call light in within reach, bed in lowest position, with wheels locked, side rails up.

## 2023-02-17 LAB
CULTURE: ABNORMAL
Lab: ABNORMAL
Lab: ABNORMAL
SPECIMEN: ABNORMAL
SPECIMEN: ABNORMAL

## 2023-03-01 ENCOUNTER — HOSPITAL ENCOUNTER (INPATIENT)
Age: 23
LOS: 2 days | Discharge: HOME | End: 2023-03-03
Attending: OBSTETRICS & GYNECOLOGY | Admitting: OBSTETRICS & GYNECOLOGY
Payer: COMMERCIAL

## 2023-03-01 ENCOUNTER — ANESTHESIA (OUTPATIENT)
Dept: LABOR AND DELIVERY | Age: 23
End: 2023-03-01
Payer: COMMERCIAL

## 2023-03-01 ENCOUNTER — ANESTHESIA EVENT (OUTPATIENT)
Dept: LABOR AND DELIVERY | Age: 23
End: 2023-03-01
Payer: COMMERCIAL

## 2023-03-01 PROBLEM — Z34.90 ENCOUNTER FOR INDUCTION OF LABOR: Status: ACTIVE | Noted: 2023-03-01

## 2023-03-01 LAB
ABO/RH: NORMAL
AMPHETAMINES: NEGATIVE
ANTIBODY SCREEN: NEGATIVE
APTT: 33.9 SECONDS (ref 25.1–37.1)
BARBITURATE SCREEN URINE: NEGATIVE
BASOPHILS ABSOLUTE: NORMAL
BASOPHILS RELATIVE PERCENT: NORMAL
BENZODIAZEPINE SCREEN, URINE: NEGATIVE
CANNABINOID SCREEN URINE: NEGATIVE
COCAINE METABOLITE: NEGATIVE
EOSINOPHILS ABSOLUTE: NORMAL
EOSINOPHILS RELATIVE PERCENT: NORMAL
HCT VFR BLD CALC: 32.3 % (ref 37–47)
HCT VFR BLD CALC: NORMAL %
HEMOGLOBIN: 10.2 GM/DL (ref 12.5–16)
HEMOGLOBIN: NORMAL
INR BLD: 0.92 INDEX
LYMPHOCYTES ABSOLUTE: NORMAL
LYMPHOCYTES RELATIVE PERCENT: NORMAL
MCH RBC QN AUTO: 28.5 PG (ref 27–31)
MCH RBC QN AUTO: NORMAL PG
MCHC RBC AUTO-ENTMCNC: 31.6 % (ref 32–36)
MCHC RBC AUTO-ENTMCNC: NORMAL G/DL
MCV RBC AUTO: 90.2 FL (ref 78–100)
MCV RBC AUTO: NORMAL FL
MONOCYTES ABSOLUTE: NORMAL
MONOCYTES RELATIVE PERCENT: NORMAL
NEUTROPHILS ABSOLUTE: NORMAL
NEUTROPHILS RELATIVE PERCENT: NORMAL
OPIATES, URINE: NEGATIVE
OXYCODONE: NEGATIVE
PDW BLD-RTO: 13.5 % (ref 11.7–14.9)
PHENCYCLIDINE, URINE: NEGATIVE
PLATELET # BLD: 206 K/CU MM (ref 140–440)
PLATELET # BLD: NORMAL 10*3/UL
PMV BLD AUTO: 11.6 FL (ref 7.5–11.1)
PMV BLD AUTO: NORMAL FL
PROTHROMBIN TIME: 11.8 SECONDS (ref 11.7–14.5)
RBC # BLD: 3.58 M/CU MM (ref 4.2–5.4)
RBC # BLD: NORMAL 10*6/UL
WBC # BLD: 14.4 K/CU MM (ref 4–10.5)
WBC # BLD: NORMAL 10*3/UL

## 2023-03-01 PROCEDURE — 2500000003 HC RX 250 WO HCPCS: Performed by: OBSTETRICS & GYNECOLOGY

## 2023-03-01 PROCEDURE — 86900 BLOOD TYPING SEROLOGIC ABO: CPT

## 2023-03-01 PROCEDURE — 7200000001 HC VAGINAL DELIVERY

## 2023-03-01 PROCEDURE — 10907ZC DRAINAGE OF AMNIOTIC FLUID, THERAPEUTIC FROM PRODUCTS OF CONCEPTION, VIA NATURAL OR ARTIFICIAL OPENING: ICD-10-PCS | Performed by: OBSTETRICS & GYNECOLOGY

## 2023-03-01 PROCEDURE — 51702 INSERT TEMP BLADDER CATH: CPT

## 2023-03-01 PROCEDURE — 85027 COMPLETE CBC AUTOMATED: CPT

## 2023-03-01 PROCEDURE — 3E0P7VZ INTRODUCTION OF HORMONE INTO FEMALE REPRODUCTIVE, VIA NATURAL OR ARTIFICIAL OPENING: ICD-10-PCS | Performed by: OBSTETRICS & GYNECOLOGY

## 2023-03-01 PROCEDURE — 0KQM0ZZ REPAIR PERINEUM MUSCLE, OPEN APPROACH: ICD-10-PCS | Performed by: OBSTETRICS & GYNECOLOGY

## 2023-03-01 PROCEDURE — 86850 RBC ANTIBODY SCREEN: CPT

## 2023-03-01 PROCEDURE — 6360000002 HC RX W HCPCS: Performed by: OBSTETRICS & GYNECOLOGY

## 2023-03-01 PROCEDURE — 1220000000 HC SEMI PRIVATE OB R&B

## 2023-03-01 PROCEDURE — 2580000003 HC RX 258: Performed by: OBSTETRICS & GYNECOLOGY

## 2023-03-01 PROCEDURE — 85610 PROTHROMBIN TIME: CPT

## 2023-03-01 PROCEDURE — 85730 THROMBOPLASTIN TIME PARTIAL: CPT

## 2023-03-01 PROCEDURE — 86901 BLOOD TYPING SEROLOGIC RH(D): CPT

## 2023-03-01 PROCEDURE — 6360000002 HC RX W HCPCS: Performed by: NURSE ANESTHETIST, CERTIFIED REGISTERED

## 2023-03-01 PROCEDURE — 80307 DRUG TEST PRSMV CHEM ANLYZR: CPT

## 2023-03-01 PROCEDURE — 3700000025 EPIDURAL BLOCK: Performed by: NURSE ANESTHETIST, CERTIFIED REGISTERED

## 2023-03-01 RX ORDER — TRANEXAMIC ACID 10 MG/ML
1000 INJECTION, SOLUTION INTRAVENOUS
Status: ACTIVE | OUTPATIENT
Start: 2023-03-01 | End: 2023-03-02

## 2023-03-01 RX ORDER — FAMOTIDINE 10 MG/ML
20 INJECTION, SOLUTION INTRAVENOUS 2 TIMES DAILY PRN
Status: DISCONTINUED | OUTPATIENT
Start: 2023-03-01 | End: 2023-03-02 | Stop reason: HOSPADM

## 2023-03-01 RX ORDER — SODIUM CHLORIDE, SODIUM LACTATE, POTASSIUM CHLORIDE, CALCIUM CHLORIDE 600; 310; 30; 20 MG/100ML; MG/100ML; MG/100ML; MG/100ML
INJECTION, SOLUTION INTRAVENOUS CONTINUOUS
Status: DISCONTINUED | OUTPATIENT
Start: 2023-03-01 | End: 2023-03-03 | Stop reason: HOSPADM

## 2023-03-01 RX ORDER — DOCUSATE SODIUM 100 MG/1
100 CAPSULE, LIQUID FILLED ORAL 2 TIMES DAILY
Status: DISCONTINUED | OUTPATIENT
Start: 2023-03-02 | End: 2023-03-03 | Stop reason: HOSPADM

## 2023-03-01 RX ORDER — SODIUM CHLORIDE 0.9 % (FLUSH) 0.9 %
5-40 SYRINGE (ML) INJECTION PRN
Status: DISCONTINUED | OUTPATIENT
Start: 2023-03-01 | End: 2023-03-03 | Stop reason: HOSPADM

## 2023-03-01 RX ORDER — SODIUM CHLORIDE 0.9 % (FLUSH) 0.9 %
5-40 SYRINGE (ML) INJECTION EVERY 12 HOURS SCHEDULED
Status: DISCONTINUED | OUTPATIENT
Start: 2023-03-01 | End: 2023-03-03 | Stop reason: HOSPADM

## 2023-03-01 RX ORDER — OXYCODONE HYDROCHLORIDE 5 MG/1
10 TABLET ORAL EVERY 4 HOURS PRN
Status: DISCONTINUED | OUTPATIENT
Start: 2023-03-01 | End: 2023-03-03 | Stop reason: HOSPADM

## 2023-03-01 RX ORDER — BISACODYL 10 MG
10 SUPPOSITORY, RECTAL RECTAL DAILY PRN
Status: DISCONTINUED | OUTPATIENT
Start: 2023-03-01 | End: 2023-03-03 | Stop reason: HOSPADM

## 2023-03-01 RX ORDER — OXYCODONE HYDROCHLORIDE 5 MG/1
5 TABLET ORAL EVERY 4 HOURS PRN
Status: DISCONTINUED | OUTPATIENT
Start: 2023-03-01 | End: 2023-03-03 | Stop reason: HOSPADM

## 2023-03-01 RX ORDER — ONDANSETRON 4 MG/1
8 TABLET, ORALLY DISINTEGRATING ORAL EVERY 8 HOURS PRN
Status: DISCONTINUED | OUTPATIENT
Start: 2023-03-01 | End: 2023-03-03 | Stop reason: HOSPADM

## 2023-03-01 RX ORDER — MISOPROSTOL 200 UG/1
800 TABLET ORAL PRN
Status: DISCONTINUED | OUTPATIENT
Start: 2023-03-01 | End: 2023-03-03 | Stop reason: HOSPADM

## 2023-03-01 RX ORDER — SODIUM CHLORIDE 9 MG/ML
25 INJECTION, SOLUTION INTRAVENOUS PRN
Status: DISCONTINUED | OUTPATIENT
Start: 2023-03-01 | End: 2023-03-01

## 2023-03-01 RX ORDER — ENOXAPARIN SODIUM 150 MG/ML
110 INJECTION SUBCUTANEOUS EVERY 12 HOURS
Status: DISCONTINUED | OUTPATIENT
Start: 2023-03-02 | End: 2023-03-02

## 2023-03-01 RX ORDER — ONDANSETRON 2 MG/ML
4 INJECTION INTRAMUSCULAR; INTRAVENOUS EVERY 6 HOURS PRN
Status: DISCONTINUED | OUTPATIENT
Start: 2023-03-01 | End: 2023-03-02 | Stop reason: HOSPADM

## 2023-03-01 RX ORDER — METHYLERGONOVINE MALEATE 0.2 MG/ML
200 INJECTION INTRAVENOUS PRN
Status: DISCONTINUED | OUTPATIENT
Start: 2023-03-01 | End: 2023-03-03 | Stop reason: HOSPADM

## 2023-03-01 RX ORDER — SODIUM CHLORIDE, SODIUM LACTATE, POTASSIUM CHLORIDE, AND CALCIUM CHLORIDE .6; .31; .03; .02 G/100ML; G/100ML; G/100ML; G/100ML
500 INJECTION, SOLUTION INTRAVENOUS PRN
Status: DISCONTINUED | OUTPATIENT
Start: 2023-03-01 | End: 2023-03-03 | Stop reason: HOSPADM

## 2023-03-01 RX ORDER — SODIUM CHLORIDE 9 MG/ML
INJECTION, SOLUTION INTRAVENOUS PRN
Status: DISCONTINUED | OUTPATIENT
Start: 2023-03-01 | End: 2023-03-03 | Stop reason: HOSPADM

## 2023-03-01 RX ORDER — SODIUM CHLORIDE, SODIUM LACTATE, POTASSIUM CHLORIDE, AND CALCIUM CHLORIDE .6; .31; .03; .02 G/100ML; G/100ML; G/100ML; G/100ML
1000 INJECTION, SOLUTION INTRAVENOUS PRN
Status: DISCONTINUED | OUTPATIENT
Start: 2023-03-01 | End: 2023-03-03 | Stop reason: HOSPADM

## 2023-03-01 RX ORDER — SODIUM CHLORIDE 0.9 % (FLUSH) 0.9 %
5-40 SYRINGE (ML) INJECTION PRN
Status: DISCONTINUED | OUTPATIENT
Start: 2023-03-01 | End: 2023-03-01

## 2023-03-01 RX ORDER — IBUPROFEN 200 MG
800 TABLET ORAL EVERY 8 HOURS
Status: DISCONTINUED | OUTPATIENT
Start: 2023-03-02 | End: 2023-03-03 | Stop reason: HOSPADM

## 2023-03-01 RX ORDER — SIMETHICONE 80 MG
80 TABLET,CHEWABLE ORAL EVERY 6 HOURS PRN
Status: DISCONTINUED | OUTPATIENT
Start: 2023-03-01 | End: 2023-03-03 | Stop reason: HOSPADM

## 2023-03-01 RX ORDER — LANOLIN 100 %
OINTMENT (GRAM) TOPICAL PRN
Status: DISCONTINUED | OUTPATIENT
Start: 2023-03-01 | End: 2023-03-03 | Stop reason: HOSPADM

## 2023-03-01 RX ORDER — LIDOCAINE HYDROCHLORIDE 10 MG/ML
30 INJECTION, SOLUTION EPIDURAL; INFILTRATION; INTRACAUDAL; PERINEURAL PRN
Status: COMPLETED | OUTPATIENT
Start: 2023-03-01 | End: 2023-03-01

## 2023-03-01 RX ORDER — CARBOPROST TROMETHAMINE 250 UG/ML
250 INJECTION, SOLUTION INTRAMUSCULAR PRN
Status: DISCONTINUED | OUTPATIENT
Start: 2023-03-01 | End: 2023-03-03 | Stop reason: HOSPADM

## 2023-03-01 RX ORDER — MISOPROSTOL 200 UG/1
800 TABLET ORAL PRN
Status: DISCONTINUED | OUTPATIENT
Start: 2023-03-01 | End: 2023-03-01

## 2023-03-01 RX ORDER — FENTANYL CITRATE 50 UG/ML
100 INJECTION, SOLUTION INTRAMUSCULAR; INTRAVENOUS
Status: DISCONTINUED | OUTPATIENT
Start: 2023-03-01 | End: 2023-03-02 | Stop reason: HOSPADM

## 2023-03-01 RX ORDER — ROPIVACAINE HYDROCHLORIDE 2 MG/ML
INJECTION, SOLUTION EPIDURAL; INFILTRATION; PERINEURAL PRN
Status: DISCONTINUED | OUTPATIENT
Start: 2023-03-01 | End: 2023-03-01 | Stop reason: SDUPTHER

## 2023-03-01 RX ORDER — FERROUS SULFATE 325(65) MG
325 TABLET ORAL 2 TIMES DAILY WITH MEALS
Status: DISCONTINUED | OUTPATIENT
Start: 2023-03-02 | End: 2023-03-03 | Stop reason: HOSPADM

## 2023-03-01 RX ADMIN — Medication 166.7 ML: at 21:14

## 2023-03-01 RX ADMIN — ROPIVACAINE HYDROCHLORIDE 5 ML: 2 INJECTION, SOLUTION EPIDURAL; INFILTRATION at 16:12

## 2023-03-01 RX ADMIN — ROPIVACAINE HYDROCHLORIDE 10 ML/HR: 2 INJECTION, SOLUTION EPIDURAL; INFILTRATION at 16:13

## 2023-03-01 RX ADMIN — SODIUM CHLORIDE, POTASSIUM CHLORIDE, SODIUM LACTATE AND CALCIUM CHLORIDE: 600; 310; 30; 20 INJECTION, SOLUTION INTRAVENOUS at 05:30

## 2023-03-01 RX ADMIN — SODIUM CHLORIDE, POTASSIUM CHLORIDE, SODIUM LACTATE AND CALCIUM CHLORIDE: 600; 310; 30; 20 INJECTION, SOLUTION INTRAVENOUS at 13:31

## 2023-03-01 RX ADMIN — CEFAZOLIN 2000 MG: 2 INJECTION, POWDER, FOR SOLUTION INTRAMUSCULAR; INTRAVENOUS at 06:12

## 2023-03-01 RX ADMIN — SODIUM CHLORIDE, POTASSIUM CHLORIDE, SODIUM LACTATE AND CALCIUM CHLORIDE 1000 ML: 600; 310; 30; 20 INJECTION, SOLUTION INTRAVENOUS at 16:00

## 2023-03-01 RX ADMIN — CEFAZOLIN 1000 MG: 1 INJECTION, POWDER, FOR SOLUTION INTRAMUSCULAR; INTRAVENOUS at 14:21

## 2023-03-01 RX ADMIN — LIDOCAINE HYDROCHLORIDE 30 ML: 10 INJECTION, SOLUTION EPIDURAL; INFILTRATION; INTRACAUDAL; PERINEURAL at 21:20

## 2023-03-01 RX ADMIN — Medication 1 MILLI-UNITS/MIN: at 10:29

## 2023-03-01 RX ADMIN — SODIUM CHLORIDE, POTASSIUM CHLORIDE, SODIUM LACTATE AND CALCIUM CHLORIDE: 600; 310; 30; 20 INJECTION, SOLUTION INTRAVENOUS at 22:09

## 2023-03-01 ASSESSMENT — PAIN - FUNCTIONAL ASSESSMENT
PAIN_FUNCTIONAL_ASSESSMENT: ACTIVITIES ARE NOT PREVENTED

## 2023-03-01 ASSESSMENT — PAIN DESCRIPTION - PAIN TYPE
TYPE: ACUTE PAIN

## 2023-03-01 ASSESSMENT — PAIN DESCRIPTION - ORIENTATION
ORIENTATION: LOWER

## 2023-03-01 ASSESSMENT — PAIN DESCRIPTION - FREQUENCY
FREQUENCY: CONTINUOUS
FREQUENCY: INTERMITTENT
FREQUENCY: INTERMITTENT

## 2023-03-01 ASSESSMENT — PAIN DESCRIPTION - ONSET
ONSET: ON-GOING
ONSET: ON-GOING
ONSET: GRADUAL

## 2023-03-01 ASSESSMENT — PAIN SCALES - GENERAL
PAINLEVEL_OUTOF10: 3
PAINLEVEL_OUTOF10: 5
PAINLEVEL_OUTOF10: 4
PAINLEVEL_OUTOF10: 0
PAINLEVEL_OUTOF10: 3

## 2023-03-01 ASSESSMENT — PAIN DESCRIPTION - LOCATION
LOCATION: ABDOMEN
LOCATION: ABDOMEN
LOCATION: BACK
LOCATION: ABDOMEN

## 2023-03-01 ASSESSMENT — PAIN DESCRIPTION - DESCRIPTORS
DESCRIPTORS: CRAMPING
DESCRIPTORS: ACHING;CRAMPING
DESCRIPTORS: CRAMPING

## 2023-03-01 NOTE — PLAN OF CARE
Problem: ABCDS Injury Assessment  Goal: Absence of physical injury  Outcome: Progressing     Problem: Vaginal Birth or  Section  Goal: Fetal and maternal status remain reassuring during the birth process  Description:  Birth OB-Pregnancy care plan goal which identifies if the fetal and maternal status remain reassuring during the birth process  Outcome: Progressing     Problem: Pain  Goal: Verbalizes/displays adequate comfort level or baseline comfort level  Outcome: Progressing     Problem: Infection - Adult  Goal: Absence of infection at discharge  Outcome: Progressing  Goal: Absence of infection during hospitalization  Outcome: Progressing  Goal: Absence of fever/infection during anticipated neutropenic period  Outcome: Progressing

## 2023-03-01 NOTE — FLOWSHEET NOTE
Dr. Angela Fox on unit, RN updates MD on del cid bulb falling out, recent SVE, and vaginal bleeding. EFM discussed and reviewed. Plan of care discussed. Plan to allow patient to ambulate in hallway, then start pitocin, then AROM when able. Pt updated on this plan of care. No questions at this time, water and juice provided.

## 2023-03-01 NOTE — FLOWSHEET NOTE
Epidural Note    [address]     TEST DOSE        BOLUS DOSE       ON IV PUMP      Pt resting comfortabley with pillow tilt. VS stable. Pt denies any needs at this time. Call light within reach.

## 2023-03-01 NOTE — ANESTHESIA PRE PROCEDURE
Department of Anesthesiology  Preprocedure Note       Name:  Susan Dietrich   Age:  26 y.o.  :  2000                                          MRN:  [id number]         Date:  3/1/2023      Surgeon: * No surgeons listed *    Procedure: * No procedures listed *    Medications prior to admission:   Prior to Admission medications    Medication Sig Start Date End Date Taking? Authorizing Provider   Enoxaparin Sodium (LOVENOX SC) Inject into the skin    Historical Provider, MD   Prenatal MV-Min-Fe Fum-FA-DHA (PRENATAL 1 PO) Take by mouth    Historical Provider, MD   enoxaparin (LOVENOX) 120 MG/0.8ML injection Inject 0.73 mLs into the skin in the morning and 0.73 mLs in the evening.  1/30/23 3/31/23  Jenny Alfaro MD   Prenatal MV-Min-Fe Fum-FA-DHA (PRENATAL 1 PO) Take by mouth    Historical Provider, MD       Current medications:    Current Facility-Administered Medications   Medication Dose Route Frequency Provider Last Rate Last Admin    lactated ringers IV soln infusion   IntraVENous Continuous Jenny Alfaro  mL/hr at 23 1457 Rate Verify at 23 1457    lactated ringers bolus  500 mL IntraVENous PRN Jenny Alfaro MD        Or    lactated ringers bolus  1,000 mL IntraVENous PRN Jenny Alfaro MD        sodium chloride flush 0.9 % injection 5-40 mL  5-40 mL IntraVENous 2 times per day Jenny Aflaro MD        sodium chloride flush 0.9 % injection 5-40 mL  5-40 mL IntraVENous PRN Jenny Alfaro MD        0.9 % sodium chloride infusion  25 mL IntraVENous PRN Jenny Alfaro MD        oxytocin (PITOCIN) 30 units in 500 mL infusion  1-20 ronnie-units/min IntraVENous Continuous Jenny Alfaro MD 8 mL/hr at 23 1431 8 ronnie-units/min at 23 1431    methylergonovine (METHERGINE) injection 200 mcg  200 mcg IntraMUSCular PRN Jenny Alfaro MD        carboprost (HEMABATE) injection 250 mcg  250 mcg IntraMUSCular PRN Uzma Booker Scarlett Qureshi MD        miSOPROStol (CYTOTEC) tablet 800 mcg  800 mcg Rectal PRN Sidney Mooney MD        tranexamic acid-NaCl IVPB premix 1,000 mg  1,000 mg IntraVENous Once PRN Sidney Mooney MD        oxytocin (PITOCIN) 30 units in 500 mL infusion  87.3 ronnie-units/min IntraVENous Continuous PRN Sidney Mooney MD        And    oxytocin (PITOCIN) 10 unit bolus from the bag  10 Units IntraVENous PRN Sidney Mooney MD        lidocaine PF 1 % injection 30 mL  30 mL Other PRN Sidney Mooney MD        fentaNYL (SUBLIMAZE) injection 100 mcg  100 mcg IntraVENous Q1H PRN iSdney Mooney MD        famotidine (PEPCID) injection 20 mg  20 mg IntraVENous BID PRN Sidney Mooney MD        [facility] injection 4 mg  4 mg IntraVENous Q6H PRN Sidney Mooney MD        ceFAZolin (ANCEF) 1,000 mg in sodium chloride 0.9 % 50 mL IVPB (mini-bag)  1,000 mg IntraVENous Q8H Sidney Mooney MD   Stopped at 03/01/23 1451       Allergies:     Allergies   Allergen Reactions    Pcn [Penicillins] Hives    Penicillins Hives       Problem List:    Patient Active Problem List   Diagnosis Code    Labor and delivery indication for care or intervention O75.9    Leg swelling in pregnancy, third trimester O12.03    DVT (deep vein thrombosis) in pregnancy O22.30    Supervision of high risk pregnancy in third trimester O09.93    33 weeks gestation of pregnancy Z3A.33    Obesity affecting pregnancy in third trimester O99.213    Encounter for triage in pregnant patient Z37.80    Encounter for induction of labor Z34.90       Past Medical History:        Diagnosis Date    Anxiety     Depression     DVT (deep vein thrombosis) in pregnancy     LLE    DVT complicating pregnancy, third trimester     LLE    FH: juvenile rheumatoid arthritis     IBS (irritable bowel syndrome)     Migraine     Obesity     Urinary frequency        Past Surgical History:        Procedure Laterality Date    WISDOM TOOTH EXTRACTION      WISDOM TOOTH EXTRACTION Bilateral        Social History:    Social History     Tobacco Use    Smoking status: Never    Smokeless tobacco: Never   Substance Use Topics    Alcohol use: Not Currently                                Counseling given: Not Answered      Vital Signs (Current):   Vitals:    03/01/23 0912 03/01/23 1148 03/01/23 1329 03/01/23 1421   BP: 112/66 122/79 136/80 116/69   Pulse: 85 79 78 83   Resp: 16 18 18 18   Temp: 36.8 °C (98.2 °F) 37.1 °C (98.7 °F) 36.7 °C (98 °F) 36.8 °C (98.2 °F)   TempSrc: Oral Oral Oral Oral   SpO2: 96% 98% 99%    Weight:       Height:                                                  BP Readings from Last 3 Encounters:   03/01/23 116/69   02/16/23 131/78   02/16/23 138/85       NPO Status:                                                                                 BMI:   Wt Readings from Last 3 Encounters:   03/01/23 244 lb (110.7 kg)   02/16/23 248 lb 9.6 oz (112.8 kg)   01/28/23 233 lb 11 oz (106 kg)     Body mass index is 40.6 kg/m².     CBC:   Lab Results   Component Value Date/Time    WBC 14.4 03/01/2023 05:30 AM    RBC 3.58 03/01/2023 05:30 AM    HGB 10.2 03/01/2023 05:30 AM    HCT 32.3 03/01/2023 05:30 AM    MCV 90.2 03/01/2023 05:30 AM    RDW 13.5 03/01/2023 05:30 AM     03/01/2023 05:30 AM       CMP:   Lab Results   Component Value Date/Time     01/28/2023 10:16 PM    K 4.0 01/28/2023 10:16 PM    CL 99 01/28/2023 10:16 PM    CO2 19 01/28/2023 10:16 PM    BUN 8 01/28/2023 10:16 PM    CREATININE 0.4 01/28/2023 10:16 PM    GFRAA >60 03/12/2022 06:40 PM    LABGLOM >60 01/28/2023 10:16 PM    GLUCOSE 84 01/28/2023 10:16 PM    PROT 6.5 01/28/2023 10:16 PM    CALCIUM 8.6 01/28/2023 10:16 PM    BILITOT 0.5 01/28/2023 10:16 PM    ALKPHOS 141 01/28/2023 10:16 PM    AST 9 01/28/2023 10:16 PM    ALT 9 01/28/2023 10:16 PM       POC Tests: No results for input(s): POCGLU, POCNA, POCK, POCCL, POCBUN, POCHEMO, POCHCT in the last 72 hours. Coags:   Lab Results   Component Value Date/Time    PROTIME 11.8 03/01/2023 07:57 AM    INR 0.92 03/01/2023 07:57 AM    APTT 33.9 03/01/2023 07:57 AM       HCG (If Applicable): No results found for: PREGTESTUR, PREGSERUM, HCG, HCGQUANT     ABGs: No results found for: PHART, PO2ART, SDQ7MZU, FAF9YUO, BEART, N9WSOHRQ     Type & Screen (If Applicable):  No results found for: LABABO, LABRH    Drug/Infectious Status (If Applicable):  No results found for: HIV, HEPCAB    COVID-19 Screening (If Applicable): No results found for: COVID19        Anesthesia Evaluation  Patient summary reviewed no history of anesthetic complications:   Airway: Mallampati: II  TM distance: >3 FB   Neck ROM: full  Mouth opening: > = 3 FB   Dental:          Pulmonary:normal exam                               Cardiovascular:                      Neuro/Psych:   (+) headaches:, psychiatric history:            GI/Hepatic/Renal:   (+) morbid obesity          Endo/Other:    (+) blood dyscrasia: anticoagulation therapy:., .                  ROS comment: Last dose of lovenox more than 24 hrs. Abdominal:             Vascular:   + DVT, .  - PE. Other Findings:           Anesthesia Plan      epidural     ASA 3       Induction: intravenous. Anesthetic plan and risks discussed with patient.                         KYRA Antony CRNA   3/1/2023

## 2023-03-01 NOTE — FLOWSHEET NOTE
Rn applies gentle traction on del cid bulb at this time, del cid bulb falls out, RN performs SVE with consent.

## 2023-03-01 NOTE — ANESTHESIA PROCEDURE NOTES
Epidural Block    Patient location during procedure: OB  Start time: 3/1/2023 3:50 PM  End time: 3/1/2023 4:13 PM  Reason for block: labor epidural  Staffing  Performed: resident/CRNA   Resident/CRNA: KYRA Irvin CRNA  Epidural  Patient position: sitting  Prep: ChloraPrep  Patient monitoring: cardiac monitor, continuous pulse ox, capnometry and frequent blood pressure checks  Approach: midline  Location: L3-4  Provider prep: mask and sterile gloves  Needle  Needle type: Tuohy   Needle gauge: 17 G  Needle length: 3.5 in  Needle insertion depth: 6 cm  Catheter type: end hole  Catheter size: 20 G  Catheter at skin depth: 12 cm  Test dose: negativeCatheter Secured: tape  Assessment  Sensory level: T8  Hemodynamics: stable  Attempts: 2  Outcomes: uncomplicated and patient tolerated procedure well  Preanesthetic Checklist  Completed: patient identified, IV checked, site marked, risks and benefits discussed, surgical/procedural consents, equipment checked, pre-op evaluation, timeout performed, anesthesia consent given, oxygen available, monitors applied/VS acknowledged, fire risk safety assessment completed and verbalized and blood product R/B/A discussed and consented

## 2023-03-01 NOTE — FLOWSHEET NOTE
Patient presents to [facility] for scheduled induction. Oriented to LD-04 and call light system. Instructed to change into gown and in need of CCUA. Patient voiced understanding. Educated patient on tentative POC for today. All questions answered.

## 2023-03-01 NOTE — H&P
Department of Obstetrics and Gynecology   Obstetrics History and Physical        CHIEF COMPLAINT:   Chief Complaint   Patient presents with    Scheduled Induction         HISTORY OF PRESENT ILLNESS:      The patient is a 26 y.o. female at 38w0d.   OB History          2    Para   0    Term   0       0    AB   1    Living             SAB   1    IAB   0    Ectopic   0    Molar   0    Multiple        Live Births                Patient presents with a chief complaint as above and is being admitted for induction and LLE DVT    Estimated Due Date: Estimated Date of Delivery: 3/15/23    PRENATAL CARE:    Complicated by: LLE DVT with lovenox anticoagulation    PAST OB HISTORY  OB History          2    Para   0    Term   0       0    AB   1    Living             SAB   1    IAB   0    Ectopic   0    Molar   0    Multiple        Live Births                    Past Medical History:        Diagnosis Date    Anxiety     Depression     DVT (deep vein thrombosis) in pregnancy     LLE    DVT complicating pregnancy, third trimester     LLE    FH: juvenile rheumatoid arthritis     IBS (irritable bowel syndrome)     Migraine     Obesity     Urinary frequency      Past Surgical History:        Procedure Laterality Date    WISDOM TOOTH EXTRACTION      WISDOM TOOTH EXTRACTION Bilateral      Allergies:  Pcn [penicillins] and Penicillins  Social History:    Social History     Socioeconomic History    Marital status: Single     Spouse name: Not on file    Number of children: Not on file    Years of education: Not on file    Highest education level: Not on file   Occupational History    Not on file   Tobacco Use    Smoking status: Never    Smokeless tobacco: Never   Vaping Use    Vaping Use: Never used   Substance and Sexual Activity    Alcohol use: Not Currently    Drug use: Never    Sexual activity: Yes     Partners: Male   Other Topics Concern    Not on file   Social History Narrative    ** Merged History Encounter **          Social Determinants of Health     Financial Resource Strain: Not on file   Food Insecurity: Not on file   Transportation Needs: Not on file   Physical Activity: Not on file   Stress: Not on file   Social Connections: Not on file   Intimate Partner Violence: Not on file   Housing Stability: Not on file     Family History:       Problem Relation Age of Onset    Heart Disease Maternal Grandmother     Mental Illness Father     Mental Illness Mother      Medications Prior to Admission:  Medications Prior to Admission: Enoxaparin Sodium (LOVENOX SC), Inject into the skin  Prenatal MV-Min-Fe Fum-FA-DHA (PRENATAL 1 PO), Take by mouth  enoxaparin (LOVENOX) 120 MG/0.8ML injection, Inject 0.73 mLs into the skin in the morning and 0.73 mLs in the evening. Prenatal MV-Min-Fe Fum-FA-DHA (PRENATAL 1 PO), Take by mouth  [DISCONTINUED] Levothyroxine Sodium (LEVOTHROID PO), Take by mouth (Patient not taking: Reported on 1/10/2023)    REVIEW OF SYSTEMS:    CONSTITUTIONAL:  negative  RESPIRATORY:  negative  CARDIOVASCULAR:  negative  GASTROINTESTINAL:  negative  ALLERGIC/IMMUNOLOGIC:  negative  NEUROLOGICAL:  negative  BEHAVIOR/PSYCH:  negative    PHYSICAL EXAM:  Blood pressure (!) 104/52, pulse 89, temperature 98.3 °F (36.8 °C), temperature source Oral, resp. rate 18, height 5' 5\" (1.651 m), weight 244 lb (110.7 kg), last menstrual period 01/14/2022, SpO2 99 %, not currently breastfeeding. General appearance:  awake, alert, cooperative, no apparent distress, and appears stated age  Neurologic:  Awake, alert, oriented to name, place and time.     Lungs:  No increased work of breathing, good air exchange  Abdomen:  Soft, non tender, gravid, consistent with her gestational age,   Fetal heart rate:    Baseline Heart Rate:  130        Accelerations:  present       Long Term Variability:  moderate       Decelerations:  absent       Pelvis:  Adequate pelvis  Cervix: 1 cm 70 soft -2      Contraction frequency:  0 minutes    Membranes:  Intact    ASSESSMENT AND PLAN:    Labor: Admit, anticipate normal delivery, routine labor orders  Fetus: Reassuring  GBS:positive  Other: IV hydration and antiemetics, IV antibiotic therapy, low dose pitocin with del cid bulb induction. Has been off Lovenox for 24 hours. Ancef for GBS +

## 2023-03-02 LAB
BASOPHILS ABSOLUTE: 0 K/CU MM
BASOPHILS RELATIVE PERCENT: 0.2 % (ref 0–1)
DIFFERENTIAL TYPE: ABNORMAL
EOSINOPHILS ABSOLUTE: 0 K/CU MM
EOSINOPHILS RELATIVE PERCENT: 0.1 % (ref 0–3)
HCT VFR BLD CALC: 25.4 % (ref 37–47)
HEMOGLOBIN: 8.2 GM/DL (ref 12.5–16)
IMMATURE NEUTROPHIL %: 1.3 % (ref 0–0.43)
LYMPHOCYTES ABSOLUTE: 1.5 K/CU MM
LYMPHOCYTES RELATIVE PERCENT: 8.2 % (ref 24–44)
MCH RBC QN AUTO: 29 PG (ref 27–31)
MCHC RBC AUTO-ENTMCNC: 32.3 % (ref 32–36)
MCV RBC AUTO: 89.8 FL (ref 78–100)
MONOCYTES ABSOLUTE: 1.3 K/CU MM
MONOCYTES RELATIVE PERCENT: 7.2 % (ref 0–4)
NUCLEATED RBC %: 0 %
PDW BLD-RTO: 13.3 % (ref 11.7–14.9)
PLATELET # BLD: 202 K/CU MM (ref 140–440)
PMV BLD AUTO: 11 FL (ref 7.5–11.1)
RBC # BLD: 2.83 M/CU MM (ref 4.2–5.4)
SEGMENTED NEUTROPHILS ABSOLUTE COUNT: 15.2 K/CU MM
SEGMENTED NEUTROPHILS RELATIVE PERCENT: 83 % (ref 36–66)
TOTAL IMMATURE NEUTOROPHIL: 0.23 K/CU MM
TOTAL NUCLEATED RBC: 0 K/CU MM
WBC # BLD: 18.3 K/CU MM (ref 4–10.5)

## 2023-03-02 PROCEDURE — 6360000002 HC RX W HCPCS: Performed by: OBSTETRICS & GYNECOLOGY

## 2023-03-02 PROCEDURE — 99254 IP/OBS CNSLTJ NEW/EST MOD 60: CPT | Performed by: INTERNAL MEDICINE

## 2023-03-02 PROCEDURE — 36415 COLL VENOUS BLD VENIPUNCTURE: CPT

## 2023-03-02 PROCEDURE — 85025 COMPLETE CBC W/AUTO DIFF WBC: CPT

## 2023-03-02 PROCEDURE — 1220000000 HC SEMI PRIVATE OB R&B

## 2023-03-02 PROCEDURE — 6370000000 HC RX 637 (ALT 250 FOR IP): Performed by: OBSTETRICS & GYNECOLOGY

## 2023-03-02 RX ORDER — SODIUM CHLORIDE 0.9 % (FLUSH) 0.9 %
5-40 SYRINGE (ML) INJECTION EVERY 12 HOURS SCHEDULED
Status: DISCONTINUED | OUTPATIENT
Start: 2023-03-02 | End: 2023-03-03 | Stop reason: HOSPADM

## 2023-03-02 RX ADMIN — ENOXAPARIN SODIUM 110 MG: 150 INJECTION SUBCUTANEOUS at 05:52

## 2023-03-02 RX ADMIN — ENOXAPARIN SODIUM 110 MG: 150 INJECTION SUBCUTANEOUS at 18:27

## 2023-03-02 RX ADMIN — IBUPROFEN 800 MG: 200 TABLET, FILM COATED ORAL at 18:27

## 2023-03-02 RX ADMIN — DOCUSATE SODIUM 100 MG: 100 CAPSULE, LIQUID FILLED ORAL at 20:48

## 2023-03-02 RX ADMIN — IBUPROFEN 800 MG: 200 TABLET, FILM COATED ORAL at 00:26

## 2023-03-02 RX ADMIN — DOCUSATE SODIUM 100 MG: 100 CAPSULE, LIQUID FILLED ORAL at 09:43

## 2023-03-02 RX ADMIN — FERROUS SULFATE TAB 325 MG (65 MG ELEMENTAL FE) 325 MG: 325 (65 FE) TAB at 09:45

## 2023-03-02 RX ADMIN — FERROUS SULFATE TAB 325 MG (65 MG ELEMENTAL FE) 325 MG: 325 (65 FE) TAB at 20:47

## 2023-03-02 RX ADMIN — IBUPROFEN 800 MG: 200 TABLET, FILM COATED ORAL at 09:44

## 2023-03-02 ASSESSMENT — PAIN DESCRIPTION - ORIENTATION
ORIENTATION: MID
ORIENTATION: LOWER
ORIENTATION: LOWER

## 2023-03-02 ASSESSMENT — PAIN SCALES - GENERAL
PAINLEVEL_OUTOF10: 3
PAINLEVEL_OUTOF10: 0

## 2023-03-02 ASSESSMENT — PAIN - FUNCTIONAL ASSESSMENT
PAIN_FUNCTIONAL_ASSESSMENT: ACTIVITIES ARE NOT PREVENTED

## 2023-03-02 ASSESSMENT — PAIN DESCRIPTION - PAIN TYPE: TYPE: ACUTE PAIN

## 2023-03-02 ASSESSMENT — PAIN DESCRIPTION - LOCATION
LOCATION: ABDOMEN
LOCATION: VAGINA
LOCATION: ABDOMEN

## 2023-03-02 ASSESSMENT — PAIN SCALES - WONG BAKER: WONGBAKER_NUMERICALRESPONSE: 0

## 2023-03-02 ASSESSMENT — PAIN DESCRIPTION - DESCRIPTORS
DESCRIPTORS: CRAMPING
DESCRIPTORS: SORE;BURNING
DESCRIPTORS: CRAMPING

## 2023-03-02 ASSESSMENT — PAIN DESCRIPTION - ONSET: ONSET: GRADUAL

## 2023-03-02 ASSESSMENT — PAIN DESCRIPTION - FREQUENCY: FREQUENCY: INTERMITTENT

## 2023-03-02 NOTE — PROGRESS NOTES
Subjective:     Postpartum Day 1:   The patient feels well and denies emotional concerns. Pain is well controlled with current medications. The baby is well. The patient is ambulating well and is tolerating a normal diet. Objective:        Vitals:    03/01/23 2349   BP: 134/64   Pulse: 100   Resp: 16   Temp: 98.8 °F (37.1 °C)   SpO2: 98%     Lab Results   Component Value Date    WBC 18.3 (H) 03/02/2023    HGB 8.2 (L) 03/02/2023    HCT 25.4 (L) 03/02/2023    MCV 89.8 03/02/2023     03/02/2023       General:    Alert, appears stated age, cooperative   Lochia:  appropriate   Uterine    Fundus firm and without tenderness   DVT Evaluation:  No evidence of DVT on exam     Assessment:     Postpartum day 1 Doing well postoperatively. Plan:     Continue current care. Consult hematology regarding current therapeutic lovenox 1mg/kg and possiblity of changing to a different regimen - like oral . Will also check cbc in am. On iron for anemia.  No active bleeding    Electronically signed by Jenny Alfaro MD on 3/2/2023 at 7:56 AM

## 2023-03-02 NOTE — PROGRESS NOTES
Patient was seen and examined this morning. Full consult will follow. Discussed with her about oral anticoagulant options. Will plan to start eliquis tomorrow. Thank you.

## 2023-03-02 NOTE — PROGRESS NOTES
Progress   Pt comfy with epidural  Ctx q 2-3, cat 1 tracing  CE 5-6/90/0  A/P Continue IOL with pitocin, currently on 8 mU

## 2023-03-02 NOTE — CONSULTS
HEMATOLOGY ONCOLOGY  Consultation Report    REASON FOR CONSULT  To evaluate the patient with left lower extremity acute DVT during pregnancy. CHIEF COMPLAINT    Chief Complaint   Patient presents with    Scheduled Induction       HISTORY OF PRESENT ILLNESS   Radha Bullock is a 26 y.o. female who was diagnosed with acute DVT of left common femoral, deep femoral and femoral veins as well as the greater saphenous vein junction on 1/28/23. She was in her third trimester of pregnancy at that time. She has been on lovenox 1 mg/kg BID since then. She delivered her baby on 3/1/23. She is not planning to breast feed baby and she prefers to switch her [facility] [place] therapy to oral form. She doesn't have previous history of VTE. Denies family history of VTE. She still has mild swelling in left lower extremity. Otherwise, she is doing well and ambulating. No active bleeding.      PAST MEDICAL HISTORY    Past Medical History:   Diagnosis Date    Anxiety     Depression     DVT (deep vein thrombosis) in pregnancy     LLE    DVT complicating pregnancy, third trimester     LLE    FH: juvenile rheumatoid arthritis     IBS (irritable bowel syndrome)     Migraine     Obesity     Urinary frequency        SURGICAL HISTORY    Past Surgical History:   Procedure Laterality Date    WISDOM TOOTH EXTRACTION      WISDOM TOOTH EXTRACTION Bilateral        FAMILY HISTORY    Family History   Problem Relation Age of Onset    Heart Disease Maternal Grandmother     Mental Illness Father     Mental Illness Mother        SOCIAL HISTORY    Social History     Socioeconomic History    Marital status: Single     Spouse name: None    Number of children: None    Years of education: None    Highest education level: None   Tobacco Use    Smoking status: Never    Smokeless tobacco: Never   Vaping Use    Vaping Use: Never used   Substance and Sexual Activity    Alcohol use: Not Currently    Drug use: Never    Sexual activity: Yes     Partners: Male Social History Narrative    ** Merged History Encounter **            REVIEW OF SYSTEMS    Constitutional:  Denies fever, chills, loss of appetite, weight loss, tiredness, fatigue or weakness   HEENT:  Denies swelling of neck glands  Respiratory:  Denies cough, shortness of breath or hemoptysis  Cardiovascular:  Denies chest pain, palpitations or swelling   GI:  Denies abdominal pain, nausea, vomiting, constipation or diarrhea   Musculoskeletal:  Denies back pain   Skin:  Denies rash   Neurologic:  Denies headache, focal weakness or sensory changes   All systems negative except as marked. PHYSICAL EXAM    Vitals: /64   Pulse 100   Temp 98.8 °F (37.1 °C) (Oral)   Resp 16   Ht 5' 5\" (1.651 m)   Wt 244 lb (110.7 kg)   LMP 01/14/2022   SpO2 98%   Breastfeeding Unknown   BMI 40.60 kg/m²   CONSTITUTIONAL: awake, alert, cooperative, no apparent distress   EYES: pupils equal, round and reactive to light, sclera clear and conjunctiva normal  ENT: Normocephalic, without obvious abnormality, atraumatic  NECK: supple, symmetrical, no jugular venous distension and no carotid bruits   HEMATOLOGIC/LYMPHATIC: no cervical, supraclavicular or axillary lymphadenopathy   LUNGS: VBS, no wheezes, no crackles, no rhonchi, no increased work of breathing and clear to auscultation   CARDIOVASCULAR: regular rate and rhythm, normal S1 and S2, no murmur noted  ABDOMEN: normal bowel sounds x 4, soft, non-distended, non-tender, no masses palpated, no hepatosplenomgaly   MUSCULOSKELETAL: full range of motion noted, tone is normal  NEUROLOGIC: awake, alert, oriented to name, place and time. Motor skills grossly intact. SKIN: Normal skin color, texture, turgor and no jaundice.  Skin appears intact   EXTREMITIES: no cyanosis, Left leg swelling +, no clubbing    LABORATORY RESULTS  CBC:   Recent Labs     03/01/23  0530 03/02/23  0514   WBC 14.4* 18.3*   HGB 10.2* 8.2*    202     BMP:  No results for input(s): NA, K, CL, CO2, BUN, CREATININE, GLUCOSE in the last 72 hours. Hepatic: No results for input(s): AST, ALT, ALB, BILITOT, ALKPHOS in the last 72 hours. INR:   Recent Labs     03/01/23  0757   INR 0.92     ASSESSMENT  Left lower extremity acute DVT during pregnancy    RECOMMENDATION  I believe her acute DVT is due to pregnancy induced hypercoagulable state. I recommend for total 3 to 6 months of AC therapy. I reviewed with her all the [facility] therapy options. Since she is not planning to breast feed her baby, DOAC is the appropriate option for her. After reviewing all the options, we have decided to switch her [facility] therapy to eliquis. I will stop lovenox after tonight dose and will start eliquis tomorrow morning. Will plan to follow her as an out patient and will repeat doppler before stopping AC therapy. We will follow the patient. Thank you for allowing me to participate in the care of this very pleasant patient.

## 2023-03-02 NOTE — L&D DELIVERY NOTE
Mother's Information      Labor Events     Labor?: No  Cervical Ripening:   Now               Little, Baby Girl Keturah Natarajan [[id number]]      Labor Events     Labor?: No   Steroids?: None  Cervical Ripening Date/Time:     Cervical Ripening Type: Watson/EASI  Antibiotics Received during Labor?: Yes  Rupture Date/Time: 3/1/23 12:38:00   Rupture Type: AROM  Fluid Color: Clear  Fluid Odor: None  Fluid Volume:  Moderate  Induction: Watson Bulb (Balloon)  Augmentation: Oxytocin       Anesthesia    Method: Epidural       Start Pushing      Labor onset date/time: 3/1/23 08:57:00 Now     Dilation complete date/time:   Now     Start pushing date/time:    Decision date/time (emergent ):           Delivery ()      Delivery Date/Time:  3/1/23 21:03:00   Delivery Type: Vaginal, Vacuum (Extractor)    Details:             Presentation    Presentation: Vertex  _: Occiput  _: Anterior       Shoulder Dystocia    Shoulder Dystocia Present?: No  Add Second Maneuver  Add Third Maneuver  Add Fourth Maneuver  Add Fifth Maneuver  Add Sixth Maneuver  Add Seventh Maneuver  Add Eighth Maneuver  Add Ninth Maneuver       Assisted Delivery Details    Forceps Attempted?: No  Vacuum Extractor Attempted?: Yes  Indications: Fetal Heart Rate or Rhythm Abnormality   Vacuum Type: Kiwi   Vacuum Application Location: Low        Document Additional Attempt         Document Additional Attempt         Number of Pop Offs: 0      Number of Pulls: 1    Vacuum Applied By: TAWANA Reese    Failed?: No          Cord    Vessels: 3 Vessels  Complications: None  Delayed Cord Clamping?: No  Cord Blood Disposition: Lab  Gases Sent?: No       Placenta    Removal: Spontaneous  Appearance: Intact  Disposition: Placenta Refrigerator       Lacerations    Episiotomy: None  Perineal Lacerations: None  Other Lacerations: labial laceration, sulcus laceration, vaginal laceration  Labial Laceration: right Repaired?: Yes     Sulcus Laceration: bilateral Repaired?: Yes     Vaginal Laceration?: Yes Repaired?: Yes   Number of Repair Packets: 3       Vaginal Counts        Sponges Needles Instruments   Initial Counts      Final Counts      Accurate Final Count?: Yes  If the count is incorrect due to Intentionally Retained Foreign Object (IRFO) add the IRFO LDA in Lines/Drains. Add LDA: Link to [facility]       Blood Loss  Mother: Farzaneh Marking #[id number]     Start of Mother's Information      Delivery Blood Loss  23 0857 - 23 2203      None                 End of Mother's Information  Mother: Farzaneh Marking #[id number]                Delivery Providers    Delivering clinician:      Provider Role     Obstetrician     Primary Nurse     Primary Wilson Nurse     NICU Nurse     Neonatologist     Anesthesiologist     Nurse Anesthetist     Nurse Practitioner     Midwife     Nursery Nurse               Assessment    Living Status: Living     Apgar Scoring Key:    0 1 2    Skin Color: Blue or pale Acrocyanotic Completely pink    Heart Rate: Absent <100 bpm >100 bpm    Reflex Irritability: No response Grimace Cry or active withdrawal    Muscle Tone: Limp Some flexion Active motion    Respiratory Effort: Absent Weak cry; hypoventilation Good, crying                      Skin Color:   Heart Rate:   Reflex Irritability:   Muscle Tone:   Respiratory Effort:    Total:            1 Minute:    0    2    1    2    2    7        Apgar 1 total from OB History    5 Minute:    1    2    2    2    2    9        Apgar 5 total from OB History    10 Minute:              15 Minute:              20 Minute:                        Apgars Assigned By: Rigoberto Villanueva RNC              Resuscitation    Method: Bulb Suction, Stimulation, Suctioning              Measurements      Birth Weight: 2853 g Birth Length: 0.483 m     Head Circumference: 0.34 m Chest Circumference: 0.32 m     Abdominal Girth: 0.31 m               Title      Skin to Skin Initiation Date/Time: 3/1/23 21:15:00 EST     Skin to Skin With: Father     Skin to Skin End Date/Time: 3/1/23 21:25:00                Department of Obstetrics and Gynecology  Spontaneous Vaginal Delivery Note         Pre-operative Diagnosis:  Term pregnancy, Induced labor, and Pregnancy complicated by: DVT    Post-operative Diagnosis:  Same + live female     Procedure: Vacuum assisted vaginal delivery    Surgeon:  Megha Clay MD    Information for the patient's :  Ova Poly [[id number]]        Anesthesia:  Epidural    Estimated blood loss:  400ml    Specimen:  Placenta not sent to pathology     Cord blood sent No    Complications:  none    Condition:  patient and infant stable    GBS positive      Details of Procedure: The patient is a 26 y.o. female at 38w0d   OB History          2    Para   0    Term   0       0    AB   1    Living             SAB   1    IAB   0    Ectopic   0    Molar   0    Multiple        Live Births                 who was admitted for induction. She received the following interventions: ARBOW, vaginal Cytotec, IV Pitocin augmentation, and del cid. The patient progressed well,did receive an epidural, became complete and started to push. After pushing for 13 minutes, the baby was noted to have deep recurrent variable decelerations. Verbal consent was obtained for vacuum assisted delivery. Position was SAULO, pain control with epidural, bladder empty. With one pull, no popoffs, the fetal head was at the delivered. With one additional push, the baby delivered, and was placed on mother abdomen. Cord was clamped and cut and infant handed off to the NICU team.  The delivery of the placenta was spontaneous. The perineum and vagina were explored and a 6 cm right vaginal sidewall, bilateral sulcus, and right labial lacerations were repaired in standard fashion.       Electronically signed by Megha Clay MD on 3/1/2023 at 10:03 PM

## 2023-03-02 NOTE — FLOWSHEET NOTE
Dr. Ramsey Lo @ bedside for patient consult. Continue Lovenox every 12 hours. Will consult again 3-3-2023.

## 2023-03-02 NOTE — ANESTHESIA POSTPROCEDURE EVALUATION
Department of Anesthesiology  Postprocedure Note    Patient: Tracy Ledbetter  MRN: [id number]  YOB: 2000  Date of evaluation: 3/2/2023      Procedure Summary     Date: 03/01/23 Room / Location:     Anesthesia Start: 8216 Anesthesia Stop: 2103    Procedure: Labor Analgesia Diagnosis:     Scheduled Providers:  Responsible Provider: KYRA Collier CRNA    Anesthesia Type: epidural ASA Status: 3          Anesthesia Type: No value filed.     Bernarda Phase I: Bernarda Score: 10    Bernarda Phase II: Bernarda Score: 10      Anesthesia Post Evaluation    Patient location during evaluation: floor  Patient participation: complete - patient participated  Pain score: 2  Airway patency: patent  Nausea & Vomiting: no nausea and no vomiting  Complications: no  Cardiovascular status: blood pressure returned to baseline  Respiratory status: acceptable and room air  Hydration status: euvolemic

## 2023-03-02 NOTE — PLAN OF CARE
Problem: ABCDS Injury Assessment  Goal: Absence of physical injury  3/1/2023 4933 by Adelso Geronimo RN  Outcome: Progressing  Flowsheets (Taken 3/1/2023 1850)  Absence of Physical Injury: Implement safety measures based on patient assessment     Problem: Vaginal Birth or  Section  Goal: Fetal and maternal status remain reassuring during the birth process  Description:  Birth OB-Pregnancy care plan goal which identifies if the fetal and maternal status remain reassuring during the birth process  3/1/2023 0629 by Adelso Geronimo RN  Outcome: Progressing     Problem: Pain  Goal: Verbalizes/displays adequate comfort level or baseline comfort level  3/1/2023 2007 by Jose Cruz Atwood RN  Outcome: Progressing  3/1/2023 0629 by Adelso Geronimo RN  Outcome: Progressing     Problem: Infection - Adult  Goal: Absence of infection at discharge  Recent Flowsheet Documentation  Taken 3/1/2023 0818 by Cara Dash RN  Absence of infection at discharge:   Assess and monitor for signs and symptoms of infection   Monitor lab/diagnostic results   Monitor all insertion sites i.e., indwelling lines, tubes and drains  3/1/2023 0629 by Adelso Geronimo RN  Outcome: Progressing  Goal: Absence of infection during hospitalization  Recent Flowsheet Documentation  Taken 3/1/2023 0818 by Cara Dash RN  Absence of infection during hospitalization:   Assess and monitor for signs and symptoms of infection   Monitor lab/diagnostic results  3/1/2023 0629 by Adelso Geronimo RN  Outcome: Progressing  Goal: Absence of fever/infection during anticipated neutropenic period  Recent Flowsheet Documentation  Taken 3/1/2023 08 by Cara Dash RN  Absence of fever/infection during anticipated neutropenic period: Monitor white blood cell count  3/1/2023 0629 by Adelso Geronimo RN  Outcome: Progressing     Problem: Safety - Adult  Goal: Free from fall injury  3/1/2023 0629 by Adelso Geronimo RN  Outcome: Progressing Problem: Discharge Planning  Goal: Discharge to home or other facility with appropriate resources  3/1/2023 0629 by Lilli Betts RN  Outcome: Progressing     Problem: Chronic Conditions and Co-morbidities  Goal: Patient's chronic conditions and co-morbidity symptoms are monitored and maintained or improved  Recent Flowsheet Documentation  Taken 3/1/2023 0818 by Feliz Quintanilla 34 - Patient's Chronic Conditions and Co-Morbidity Symptoms are Monitored and Maintained or Improved:   Monitor and assess patient's chronic conditions and comorbid symptoms for stability, deterioration, or improvement   Collaborate with multidisciplinary team to address chronic and comorbid conditions and prevent exacerbation or deterioration   Update acute care plan with appropriate goals if chronic or comorbid symptoms are exacerbated and prevent overall improvement and discharge  3/1/2023 0629 by Lilli Betts RN  Outcome: Progressing

## 2023-03-03 VITALS
DIASTOLIC BLOOD PRESSURE: 72 MMHG | SYSTOLIC BLOOD PRESSURE: 139 MMHG | OXYGEN SATURATION: 98 % | BODY MASS INDEX: 40.65 KG/M2 | HEIGHT: 65 IN | RESPIRATION RATE: 18 BRPM | WEIGHT: 244 LBS | TEMPERATURE: 97.8 F | HEART RATE: 92 BPM

## 2023-03-03 LAB
BASOPHILS ABSOLUTE: 0 K/CU MM
BASOPHILS RELATIVE PERCENT: 0.3 % (ref 0–1)
DIFFERENTIAL TYPE: ABNORMAL
EOSINOPHILS ABSOLUTE: 0.2 K/CU MM
EOSINOPHILS RELATIVE PERCENT: 1.7 % (ref 0–3)
HCT VFR BLD CALC: 24.4 % (ref 37–47)
HEMOGLOBIN: 7.7 GM/DL (ref 12.5–16)
IMMATURE NEUTROPHIL %: 2.8 % (ref 0–0.43)
LYMPHOCYTES ABSOLUTE: 2.6 K/CU MM
LYMPHOCYTES RELATIVE PERCENT: 19.3 % (ref 24–44)
MCH RBC QN AUTO: 29.2 PG (ref 27–31)
MCHC RBC AUTO-ENTMCNC: 31.6 % (ref 32–36)
MCV RBC AUTO: 92.4 FL (ref 78–100)
MONOCYTES ABSOLUTE: 1 K/CU MM
MONOCYTES RELATIVE PERCENT: 7.4 % (ref 0–4)
NUCLEATED RBC %: 0 %
PDW BLD-RTO: 13.9 % (ref 11.7–14.9)
PLATELET # BLD: 188 K/CU MM (ref 140–440)
PMV BLD AUTO: 11.3 FL (ref 7.5–11.1)
RBC # BLD: 2.64 M/CU MM (ref 4.2–5.4)
SEGMENTED NEUTROPHILS ABSOLUTE COUNT: 9.3 K/CU MM
SEGMENTED NEUTROPHILS RELATIVE PERCENT: 68.5 % (ref 36–66)
TOTAL IMMATURE NEUTOROPHIL: 0.38 K/CU MM
TOTAL NUCLEATED RBC: 0 K/CU MM
WBC # BLD: 13.6 K/CU MM (ref 4–10.5)

## 2023-03-03 PROCEDURE — 6370000000 HC RX 637 (ALT 250 FOR IP): Performed by: INTERNAL MEDICINE

## 2023-03-03 PROCEDURE — 99232 SBSQ HOSP IP/OBS MODERATE 35: CPT | Performed by: INTERNAL MEDICINE

## 2023-03-03 PROCEDURE — 85025 COMPLETE CBC W/AUTO DIFF WBC: CPT

## 2023-03-03 PROCEDURE — 36415 COLL VENOUS BLD VENIPUNCTURE: CPT

## 2023-03-03 PROCEDURE — 6370000000 HC RX 637 (ALT 250 FOR IP): Performed by: OBSTETRICS & GYNECOLOGY

## 2023-03-03 RX ORDER — IBUPROFEN 800 MG/1
800 TABLET ORAL EVERY 8 HOURS
Qty: 120 TABLET | Refills: 3 | Status: SHIPPED | OUTPATIENT
Start: 2023-03-03

## 2023-03-03 RX ORDER — PSEUDOEPHEDRINE HCL 30 MG
100 TABLET ORAL 2 TIMES DAILY
Qty: 30 CAPSULE | Refills: 0 | Status: SHIPPED | OUTPATIENT
Start: 2023-03-03

## 2023-03-03 RX ORDER — FERROUS SULFATE 325(65) MG
325 TABLET ORAL 2 TIMES DAILY WITH MEALS
Qty: 30 TABLET | Refills: 3 | Status: SHIPPED | OUTPATIENT
Start: 2023-03-03

## 2023-03-03 RX ADMIN — DOCUSATE SODIUM 100 MG: 100 CAPSULE, LIQUID FILLED ORAL at 10:34

## 2023-03-03 RX ADMIN — FERROUS SULFATE TAB 325 MG (65 MG ELEMENTAL FE) 325 MG: 325 (65 FE) TAB at 10:35

## 2023-03-03 RX ADMIN — IBUPROFEN 800 MG: 200 TABLET, FILM COATED ORAL at 03:05

## 2023-03-03 RX ADMIN — IBUPROFEN 800 MG: 200 TABLET, FILM COATED ORAL at 10:35

## 2023-03-03 RX ADMIN — APIXABAN 5 MG: 5 TABLET, FILM COATED ORAL at 10:38

## 2023-03-03 ASSESSMENT — PAIN DESCRIPTION - LOCATION
LOCATION: ABDOMEN
LOCATION: ABDOMEN

## 2023-03-03 ASSESSMENT — PAIN - FUNCTIONAL ASSESSMENT
PAIN_FUNCTIONAL_ASSESSMENT: ACTIVITIES ARE NOT PREVENTED
PAIN_FUNCTIONAL_ASSESSMENT: ACTIVITIES ARE NOT PREVENTED

## 2023-03-03 ASSESSMENT — PAIN DESCRIPTION - ONSET
ONSET: GRADUAL
ONSET: GRADUAL

## 2023-03-03 ASSESSMENT — PAIN SCALES - GENERAL
PAINLEVEL_OUTOF10: 3
PAINLEVEL_OUTOF10: 3
PAINLEVEL_OUTOF10: 0

## 2023-03-03 ASSESSMENT — PAIN DESCRIPTION - ORIENTATION
ORIENTATION: LOWER
ORIENTATION: LOWER

## 2023-03-03 ASSESSMENT — PAIN DESCRIPTION - DESCRIPTORS
DESCRIPTORS: CRAMPING
DESCRIPTORS: CRAMPING

## 2023-03-03 ASSESSMENT — PAIN DESCRIPTION - PAIN TYPE: TYPE: ACUTE PAIN

## 2023-03-03 ASSESSMENT — PAIN DESCRIPTION - FREQUENCY: FREQUENCY: INTERMITTENT

## 2023-03-03 NOTE — PROGRESS NOTES
Department of Obstetrics and Gynecology  Labor and Delivery   Post Partum Progress Note      SUBJECTIVE:  Doing well with no complaints. Reports bleeding is decreasing and pain is well controlled with medication. Has voided without difficulty. Has not had BM, but + flatus. Eating and drinking well. Denies HA/visual changes/epigastric pain. Breastfeeding is going well. Reports good social support. Denies emotional concerns. OBJECTIVE:      Vitals:  /70   Pulse 80   Temp 98.1 °F (36.7 °C) (Oral)   Resp 18   Ht 5' 5\" (1.651 m)   Wt 244 lb (110.7 kg)   LMP 2022   SpO2 96%   Breastfeeding Unknown   BMI 40.60 kg/m²   Lab Results   Component Value Date    WBC 13.6 (H) 2023    HGB 7.7 (L) 2023    HCT 24.4 (L) 2023    MCV 92.4 2023     2023       ABDOMEN:  Soft, non-tender. Fundus firm at u-1. BS present x 4 quadrants. LOCHIA: Normal per pt  LUNGS: CTAB  HEART: RRR  EXTREMITIES: No calf tenderness, erythema or swelling bilaterally       ASSESSMENT:      PPD # 1  S/p   Breastfeeding well  GBS Positive  RH A+/  Anemia  DVT    PLAN:     Will plan for discharge on PPD2. Discharge teaching completed including counseling on warning signs (heavy vaginal bleeding, s/s of preeclampsia, fever >100.4, ACHES, s/s of PPD). Rx for colace ferrous sulfate and ibuprofen.   Pt to schedule f/u pp visit at 6 weeks in the [facility] Office and 2-3 weeks with Vincent Cabral

## 2023-03-03 NOTE — PROGRESS NOTES
HEMATOLOGY ONCOLOGY  Progress Note    Joselyn Soto is a 26 y.o. female who was diagnosed with acute DVT of left common femoral, deep femoral and femoral veins as well as the greater saphenous vein junction on 1/28/23. She was in her third trimester of pregnancy at that time. She has been on lovenox 1 mg/kg BID since then. She delivered her baby on 3/1/23. She is not planning to breast feed baby and she prefers to switch her [facility] therapy to oral form. She doesn't have previous history of VTE. Denies family history of VTE. She still has mild swelling in left lower extremity. Otherwise, she is doing well and ambulating. No active bleeding. Interval: Patient seen. She is resting. No heavy bleeding. Switched to Eliquis this morning. She understands that she is not to breast-feed on this medication. Discussed that for 6 weeks after delivery are most likely for clot recurrence and that adherence is very important. Talked about bleeding precautions. Postpartum bleeding is expected and may be somewhat heavier on the blood thinner advised to contact OB or us for heavy soaking dark bleeding. She is agreeable to follow-up in clinic in 2 or 3 weeks.      REVIEW OF SYSTEMS    As noted in HPI, 10 point ROS otherwise negative    PHYSICAL EXAM    Vitals: /70   Pulse 80   Temp 98.1 °F (36.7 °C) (Oral)   Resp 18   Ht 5' 5\" (1.651 m)   Wt 244 lb (110.7 kg)   LMP 01/14/2022   SpO2 96%   Breastfeeding Unknown   BMI 40.60 kg/m²   CONSTITUTIONAL: awake, alert, cooperative, no apparent distress   EYES: EOM grossly intact, no conjunctival pallor, no scleral icterus  ENT: Normocephalic, without obvious abnormality, atraumatic  NECK: supple, symmetrical, no jugular venous distension  HEMATOLOGIC/LYMPHATIC: no cervical, supraclavicular or axillary lymphadenopathy   LUNGS: CTA bilaterally, no wheezes/rhonchi/rales, unlabored on RA   CARDIOVASCULAR: regular rate and rhythm, normal S1 and S2, no murmur noted  ABDOMEN: Non-tender, non-distended, NABS  MUSCULOSKELETAL: full range of motion noted, tone is normal  NEUROLOGIC: awake, alert, oriented to name, place and time. Motor skills grossly intact. SKIN: warm and dry, no jaundice, no bruising or petechiae. EXTREMITIES: +1 edema to LLE, no clubbing or cyanosis      LABORATORY RESULTS  CBC:   Recent Labs     03/01/23  0530 03/02/23  0514 03/03/23  0348   WBC 14.4* 18.3* 13.6*   HGB 10.2* 8.2* 7.7*    202 188       BMP:  No results for input(s): NA, K, CL, CO2, BUN, CREATININE, GLUCOSE in the last 72 hours. Hepatic: No results for input(s): AST, ALT, ALB, BILITOT, ALKPHOS in the last 72 hours. INR:   Recent Labs     03/01/23  0757   INR 0.92       ASSESSMENT  Left lower extremity acute DVT during pregnancy    RECOMMENDATION  I believe her acute DVT is due to pregnancy induced hypercoagulable state. I recommend for total 3 to 6 months of AC therapy. I reviewed with her all the [facility] therapy options. Since she is not planning to breast feed her baby, DOAC is the appropriate option for her. After reviewing all the options, we have decided to switch her [facility] therapy to eliquis. Started this morning. Okay to start at 5 mg twice daily and continue with this dose. Please send her with 30 day script, we can refill. Will plan to follow her as an outpatient and will repeat doppler before stopping AC therapy. We will follow the patient. Thank you for allowing me to participate in the care of this very pleasant patient.

## 2023-03-03 NOTE — PLAN OF CARE
Problem: ABCDS Injury Assessment  Goal: Absence of physical injury  Outcome: Progressing     Problem: Vaginal Birth or  Section  Goal: Fetal and maternal status remain reassuring during the birth process  Description:  Birth OB-Pregnancy care plan goal which identifies if the fetal and maternal status remain reassuring during the birth process  Outcome: Progressing     Problem: Pain  Goal: Verbalizes/displays adequate comfort level or baseline comfort level  Outcome: Progressing  Flowsheets (Taken 3/2/2023 2044)  Verbalizes/displays adequate comfort level or baseline comfort level: Encourage patient to monitor pain and request assistance     Problem: Infection - Adult  Goal: Absence of infection at discharge  Outcome: Progressing  Goal: Absence of infection during hospitalization  Outcome: Progressing  Goal: Absence of fever/infection during anticipated neutropenic period  Outcome: Progressing     Problem: Safety - Adult  Goal: Free from fall injury  Outcome: Progressing     Problem: Discharge Planning  Goal: Discharge to home or other facility with appropriate resources  Outcome: Progressing     Problem: Chronic Conditions and Co-morbidities  Goal: Patient's chronic conditions and co-morbidity symptoms are monitored and maintained or improved  Outcome: Progressing

## 2023-03-03 NOTE — DISCHARGE SUMMARY
Obstetrical Discharge Form    Gestational Age:  38w0d    Antepartum complications: DVT,     Date of Delivery:   3/1/2023      Type of Delivery:   vacuum extraction    Delivered By:    Dr Freddie Raymond:       Information for the patient's :  Geo Soto [[id number]]      Anesthesia:    Epidural    Intrapartum complications: Non-reassuring Fetal Status    Feeding method:   bottle -     Postpartum complications: anemia, DVT    Discharge Date:   3/3/2023    Condition of discharge:  good    Plan:   Follow up    in 6 week(s) for OB and 2-3 week for hematology

## 2023-03-03 NOTE — DISCHARGE INSTRUCTIONS
Discharge Instructions    Thank you for letting us care for you and your family. The following are  discharge instructions for yourself and your baby. If you have any questions once you have arrived home please feel free to contact [facility] at [phone]. MOM INSTRUCTIONS    DIET    Eat a well balanced diet focusing on foods high in fiber and protein. Drink plenty of fluids (  8 to 10 glasses a day) especially water. To avoid constipation you may take a mild stool softener as recommended by your doctor or midwife. ACTIVITY    Gradually increase your activity. Resume your exercise regimen only after advised by you doctor or midwife. Avoid lifting anything heavier than your baby for 6 weeks or until otherwise advised by your doctor or midwife. Avoid driving for 2 weeks or if taking narcotics. Climb stairs one at a time. Use caution when carrying your baby up and down the stairs. NO SEXUAL ACTIVITY and nothing in your vagina( tampons or douching) for 4 to 6 weeks or until advised by your doctor or midwife. Be prepared to discuss family planning at your follow-up OB visit. You may feel tired or have a lack of energy. Nap when the baby naps to catch up on your sleep. You may continue to take prenatal vitamin to replenish nutrients post delivery as directed by your doctor or midwife. EMOTIONS    You may feel sad, teary, overwhelmed and henderson. Contact your OB provider if symptoms worsen or last more than 2 weeks. Contact your OB provider if you have thoughts of harming yourself or your baby. If your baby will not stop crying and you are feeling overwhelmed, place your baby in a crib on their back and contact another adult for help. NEVER SHAKE A BABY. BLEEDING    Your vaginal bleeding will decrease in amount over the next 2 to 6 weeks. You will notice that as your activity increases your flow may increase.  This is your body's way of telling you to take it easy and rest more.  If you saturate more than one maxi pad in an hour or you pass a clot larger than a lemon and resting does not help the flow slow down , call your OB doctor or midwife. If your bleeding has a foul odor call you doctor or midwife. BREAST CARE    For breastfeeding moms:  Refer to your breastfeeding booklet provided by the hospital if you have any questions. If you become engorged,feeding may be more difficult or painful for 1 to 2 days. You may find it helpful to express some milk before feeding so that the infant can latch on more easily. Continue to take your prenatal vitamins as directed by your doctor or midwife while you are breastfeeding. For any questions or concerns, contact our Lactation Consultant at [phone]. For non-breastfeeding moms:  You may apply ice packs to your breasts over your bra for twenty minutes at a time for comfort. Avoid stimulation to your breasts. When showering allow the water to strike your back not your breasts. Do not express your milk. Wear a good fitting bra. INCISIONAL CARE    Clean your incision in the shower with mild soap. After your shower pat the incision dry and keep the area open to the air. If used, steri-strips should be removed by 2 weeks. If used,staples should be removed by the OB's office in 1 week. If ordered, an abdominal binder may provide support for your incision. Have some one check your incision ever day for swelling,bleeding,drainage,foul odor,redness and that the edges are approximated. If your incision has any of the above,notify you doctor or midwife. PERINEAL CARE    Use the agapito-bottle every time after you use the toilet. Cleanse your perineum from front to back. If you had stitches in your perineum,they will dissolve in 4 to 6 weeks. You may use a sitz bath and Tucks pads as directed and as needed for comfort. SWELLING    Try to keep your legs elevated when you are sitting.   When lying down keep your legs elevated. When wearing stockings or socks,make sure they are not too tight. WHEN TO CALL THE DOCTOR    If you have a temperature of 100.6 degrees or higher. If your bleeding has increased and your are soaking a maxi-pad in an hour. If your abdomen is tender to touch. If you are passing blood clots bigger than the size of a lemon. If you are experiencing extreme weakness or dizziness. If you have are having flu-like symptoms such as achy joints and muscles. If there is a foul smell or a green color to your vaginal bleeding. If you have pain that can not be relieved. If you have persistent burning with urination or frequent urination. If you have concerns about your well-being. If you have a warm,firm, red lump in your breast.  If you are unable to sleep,eat, or are having thoughts of harming yourself or the baby. If you have a red,warm, tender area in your calf. If you have swelling, bleeding, drainage,foul odor,redness or warmth in or around your incision or stitches. PAMPHLETS GIVEN TO PARENTS    [employer] [employer]   Good Nutrition for Women, Infants and Children  : Sounds of Pertussis to help protect the health and wellness of adults and infants. [address] [facility]: [address] [facility]: [place]  hearing Screening  [facility]: [place], [place] [facility] Postpartum Depression Leslie Soto [address] Department of Health: All kids need Hepatitis B shots! Back to sleep handout  Shaken baby handout        [address]   [facility] [address] , [address] [facility] [place] [address] [address]  [phone]                   Depression After Childbirth: Care Instructions  It's common to lose sleep, feel irritable, and cry easily during the first few days after childbirth. Hormone changes and the demands of a new baby can cause these \"baby blues. \" If these mood changes last more than 2 weeks, you may have postpartum depression. This is a medical condition that requires treatment. If you have any of these signs, you may be depressed. See your doctor right away. You feel very sad or hopeless and lose interest in daily activities. You sleep too much or not enough. You feel tired or as if you have no energy. You eat too much or too little. You write or talk about death. Where to get help 24 hours a day, 7 days a week   If you or someone you know talks about suicide, self-harm, a mental health crisis, a substance use crisis, or any other kind of emotional distress, get help right away. You can:   Call the Suicide and Crisis Lifeline at 65. Call 6-729-555-OBKW (). 8-385.268.3010    Text HOME to 526251 to access the Crisis Text Line. Consider saving these numbers in your phone. What you can do   Try to go to all of your counseling sessions. Take medicines as directed. Eat healthy foods. Get daily exercise, such as walks. Try to get some sunlight every day. Avoid using alcohol or other substances. Get as much rest as possible. Connect with friends, and join a support group for new parents. When should you call for help? Call 911 if: You feel you cannot stop from hurting yourself, your baby, or someone else. Call your doctor now or seek immediate medical care if:    You are having trouble caring for yourself or your baby. You hear voices. Contact your doctor if:    You have problems with your medicines. You do not get better as expected. Follow-up care is a key part of your treatment and safety. Be sure to make and go to all appointments, and call your doctor if you are having problems. It's also a good idea to know your test results and keep a list of the medicines you take. Where can you learn more?   Go to http://www.woods.com/ and enter Y765 to learn more about \"Depression After Childbirth: Care Instructions. \"  Current as of: February 9, 2022               Content Version: 13.5  © 5240-1369 [employer]. Care instructions adapted under license by [facility] ([facility]). If you have questions about a medical condition or this instruction, always ask your healthcare professional. [address] any warranty or liability for your use of this information.

## 2023-03-04 NOTE — DISCHARGE SUMMARY
ID bands checked. Infant's ID band removed and stapled to footprint sheet, the mother verified as correct, signed and witnessed by RN. Hugs tag removed. Mother of baby signed Safe Baby Crib Form verifying that she does have a safe crib for baby at home. Discharge instructions given and reviewed. Patient voiced understanding. Rx's for Ibuprofen,and Ferrous Sulfate, Apixaben and Colace reviewed and Electronically sent to Patient Pharmacy. Patient voiced understanding. Patient is ambulating well at discharge with pain #0. Pt's  is driving patient and baby home. Mother verbalizes understanding to follow-up with Pediatric Provider,  in 2-3 days, and OB Provider Dr. Rashmi Dhaliwal  in 6 weeks as instructed. Baby pink, harnessed into carseat at discharge by parents. Parents and baby escorted to hospital exit by nurse.

## 2023-03-31 ENCOUNTER — OFFICE VISIT (OUTPATIENT)
Dept: ONCOLOGY | Age: 23
End: 2023-03-31
Payer: COMMERCIAL

## 2023-03-31 ENCOUNTER — HOSPITAL ENCOUNTER (OUTPATIENT)
Dept: INFUSION THERAPY | Age: 23
Discharge: HOME | End: 2023-03-31
Payer: COMMERCIAL

## 2023-03-31 ENCOUNTER — TELEPHONE (OUTPATIENT)
Dept: ONCOLOGY | Age: 23
End: 2023-03-31

## 2023-03-31 VITALS
HEIGHT: 65 IN | OXYGEN SATURATION: 98 % | DIASTOLIC BLOOD PRESSURE: 67 MMHG | TEMPERATURE: 97.5 F | RESPIRATION RATE: 18 BRPM | WEIGHT: 226.6 LBS | HEART RATE: 78 BPM | SYSTOLIC BLOOD PRESSURE: 127 MMHG | BODY MASS INDEX: 37.75 KG/M2

## 2023-03-31 DIAGNOSIS — I82.412 ACUTE DEEP VEIN THROMBOSIS (DVT) OF FEMORAL VEIN OF LEFT LOWER EXTREMITY (HCC): ICD-10-CM

## 2023-03-31 DIAGNOSIS — O22.30 DVT (DEEP VEIN THROMBOSIS) IN PREGNANCY: Primary | ICD-10-CM

## 2023-03-31 PROCEDURE — G8484 FLU IMMUNIZE NO ADMIN: HCPCS | Performed by: INTERNAL MEDICINE

## 2023-03-31 PROCEDURE — 1111F DSCHRG MED/CURRENT MED MERGE: CPT | Performed by: INTERNAL MEDICINE

## 2023-03-31 PROCEDURE — 99214 OFFICE O/P EST MOD 30 MIN: CPT | Performed by: INTERNAL MEDICINE

## 2023-03-31 PROCEDURE — 1036F TOBACCO NON-USER: CPT | Performed by: INTERNAL MEDICINE

## 2023-03-31 PROCEDURE — G8419 CALC BMI OUT NRM PARAM NOF/U: HCPCS | Performed by: INTERNAL MEDICINE

## 2023-03-31 PROCEDURE — G8427 DOCREV CUR MEDS BY ELIG CLIN: HCPCS | Performed by: INTERNAL MEDICINE

## 2023-03-31 PROCEDURE — 99211 OFF/OP EST MAY X REQ PHY/QHP: CPT

## 2023-03-31 RX ORDER — HYDROXYCHLOROQUINE SULFATE 200 MG/1
TABLET, FILM COATED ORAL DAILY
COMMUNITY

## 2023-03-31 RX ORDER — FLUOXETINE HYDROCHLORIDE 20 MG/1
20 CAPSULE ORAL DAILY
COMMUNITY
Start: 2023-03-30

## 2023-03-31 RX ORDER — ETODOLAC 500 MG/1
500 TABLET, FILM COATED ORAL DAILY
COMMUNITY

## 2023-03-31 RX ORDER — BUPROPION HYDROCHLORIDE 100 MG/1
TABLET, EXTENDED RELEASE ORAL
COMMUNITY
Start: 2023-03-30

## 2023-03-31 ASSESSMENT — PATIENT HEALTH QUESTIONNAIRE - PHQ9
2. FEELING DOWN, DEPRESSED OR HOPELESS: 1
SUM OF ALL RESPONSES TO PHQ QUESTIONS 1-9: 2
SUM OF ALL RESPONSES TO PHQ QUESTIONS 1-9: 2
SUM OF ALL RESPONSES TO PHQ9 QUESTIONS 1 & 2: 2
SUM OF ALL RESPONSES TO PHQ QUESTIONS 1-9: 2
SUM OF ALL RESPONSES TO PHQ QUESTIONS 1-9: 2
1. LITTLE INTEREST OR PLEASURE IN DOING THINGS: 1

## 2023-03-31 NOTE — TELEPHONE ENCOUNTER
3/31/23 - [facility]. Please bring insurance card and photo id. I asked pt to call back and confirm appt.

## 2023-03-31 NOTE — PROGRESS NOTES
MA Rooming Questions  Patient:  Delroy Roberto  MRN: [id number]    Date: 3/31/2023        LYRIC Em
has VTE during pregnancy and heart disease. Her parents have mental illness. Allergies   Allergen Reactions    Pcn [Penicillins] Hives    Penicillins Hives       Current Outpatient Medications on File Prior to Visit   Medication Sig Dispense Refill    buPROPion (WELLBUTRIN SR) 100 MG extended release tablet       FLUoxetine (PROZAC) 20 MG capsule Take 20 mg by mouth daily      hydroxychloroquine (PLAQUENIL) 200 MG tablet Take by mouth daily      etodolac (LODINE) 500 MG tablet Take 500 mg by mouth daily      ferrous sulfate (IRON 325) 325 (65 Fe) MG tablet Take 1 tablet by mouth 2 times daily (with meals) 30 tablet 3    ibuprofen (ADVIL;MOTRIN) 800 MG tablet Take 1 tablet by mouth every 8 (eight) hours 120 tablet 3     No current facility-administered medications on file prior to visit. Review of Systems:  Constitutional:  No weight loss, No fever, No chills, No night sweats. Energy level good. Eyes:  No diplopia, No transient or permanent loss of vision, No scotomata. ENT / Mouth:  No epistaxis, No dysphagia, No hoarseness, No oral ulcers, No gingival bleeding. No sore throat, No postnasal drip, No nasal drip, No mouth pain, No sinus pain, No tinnitus, Normal hearing. Cardiovascular:  No chest pain, No palpitations, No syncope, No upper extremity edema, No lower extremity edema, No calf discomfort. Respiratory:  No cough. No hemoptysis, No pleurisy, No wheezing, No dyspnea. Breast:  No breast mass, No pain, No nipple discharge, No change in size, No change in shape. Gastrointestinal:  No abdominal pain, No abdominal cramping, No nausea, No vomiting, No constipation, No diarrhea, No hematochezia, No melena, No jaundice, No dyspepsia, No dysphagia. Urinary:  No dysuria, No hematuria, No urinary incontinence. Gynecological:  No vaginal discharge, No suprapubic pain, No abnormal vaginal bleeding.     Musculoskeletal:  No muscle pain, No swollen joints, No joint redness, No bone pain, No spine

## 2023-03-31 NOTE — TELEPHONE ENCOUNTER
Patient left message requesting a refill for Eliquis 5mg to be sent to [facility] in [place]. Pending RX to Provider to be sent to pharmacy.

## 2023-03-31 NOTE — LETTER
April 2, 2023      Johnathon Gaffney, [address] [address]      Patient: [address]   MR Number: [id number]   YOB: 2000   Date of Visit: 3/31/2023       Dear Johnathon Gaffney: Thank you for referring Anabela Clancy to me for evaluation/treatment. Below are the relevant portions of my assessment and plan of care. If you have questions, please do not hesitate to call me. I look forward to following Ran Goodman along with you.     Sincerely,        Kosta Yanes MD

## 2023-04-03 ENCOUNTER — HOSPITAL ENCOUNTER (OUTPATIENT)
Dept: ULTRASOUND IMAGING | Age: 23
Discharge: HOME | End: 2023-04-03
Payer: COMMERCIAL

## 2023-04-03 DIAGNOSIS — O22.30 DVT (DEEP VEIN THROMBOSIS) IN PREGNANCY: ICD-10-CM

## 2023-04-03 DIAGNOSIS — I82.412 ACUTE DEEP VEIN THROMBOSIS (DVT) OF FEMORAL VEIN OF LEFT LOWER EXTREMITY (HCC): ICD-10-CM

## 2023-04-03 PROCEDURE — 93971 EXTREMITY STUDY: CPT

## 2023-04-05 ENCOUNTER — CLINICAL DOCUMENTATION (OUTPATIENT)
Dept: ONCOLOGY | Age: 23
End: 2023-04-05

## 2023-04-05 DIAGNOSIS — I82.412 ACUTE DEEP VEIN THROMBOSIS (DVT) OF FEMORAL VEIN OF LEFT LOWER EXTREMITY (HCC): Primary | ICD-10-CM

## 2023-04-05 DIAGNOSIS — O22.30 DVT (DEEP VEIN THROMBOSIS) IN PREGNANCY: Primary | ICD-10-CM

## 2023-04-05 DIAGNOSIS — I82.412 ACUTE DEEP VEIN THROMBOSIS (DVT) OF FEMORAL VEIN OF LEFT LOWER EXTREMITY (HCC): ICD-10-CM

## 2023-04-05 NOTE — PROGRESS NOTES
This nurse called the patient @ [phone] to review ultrasound results. Patient was notified of Dr Figueroa Jimenez recommendation to follow with Dr Megan Mayberry, cardiology for further evaluation and potential interventions. Patient is agreeable to this and the referral was placed. The patient was notified to contact this office if she has not heard from cardiology by 4/10. Patient verbalized understanding and denies further needs at this time.

## 2023-04-05 NOTE — PROGRESS NOTES
Doppler showed improvement in blood clot but patient still has significant clot burden. Patient still has significant swelling in LLE as well. Physician recommending to refer to Dr. Wally Chaidez for further evaluation to see if any intervention needed. Order placed for referral to cardiology per physician order.

## 2023-04-24 ENCOUNTER — HOSPITAL ENCOUNTER (OUTPATIENT)
Dept: INFUSION THERAPY | Age: 23
Discharge: HOME | End: 2023-04-24
Payer: COMMERCIAL

## 2023-04-24 ENCOUNTER — OFFICE VISIT (OUTPATIENT)
Dept: ONCOLOGY | Age: 23
End: 2023-04-24
Payer: COMMERCIAL

## 2023-04-24 VITALS
HEART RATE: 91 BPM | TEMPERATURE: 97.7 F | BODY MASS INDEX: 36.69 KG/M2 | DIASTOLIC BLOOD PRESSURE: 60 MMHG | HEIGHT: 65 IN | OXYGEN SATURATION: 98 % | WEIGHT: 220.2 LBS | RESPIRATION RATE: 18 BRPM | SYSTOLIC BLOOD PRESSURE: 135 MMHG

## 2023-04-24 DIAGNOSIS — O22.30 DVT (DEEP VEIN THROMBOSIS) IN PREGNANCY: Primary | ICD-10-CM

## 2023-04-24 DIAGNOSIS — I82.412 ACUTE DEEP VEIN THROMBOSIS (DVT) OF FEMORAL VEIN OF LEFT LOWER EXTREMITY (HCC): ICD-10-CM

## 2023-04-24 PROCEDURE — 99213 OFFICE O/P EST LOW 20 MIN: CPT | Performed by: INTERNAL MEDICINE

## 2023-04-24 PROCEDURE — 1036F TOBACCO NON-USER: CPT | Performed by: INTERNAL MEDICINE

## 2023-04-24 PROCEDURE — 99211 OFF/OP EST MAY X REQ PHY/QHP: CPT

## 2023-04-24 PROCEDURE — G8427 DOCREV CUR MEDS BY ELIG CLIN: HCPCS | Performed by: INTERNAL MEDICINE

## 2023-04-24 PROCEDURE — G8417 CALC BMI ABV UP PARAM F/U: HCPCS | Performed by: INTERNAL MEDICINE

## 2023-04-24 ASSESSMENT — PATIENT HEALTH QUESTIONNAIRE - PHQ9
SUM OF ALL RESPONSES TO PHQ9 QUESTIONS 1 & 2: 0
SUM OF ALL RESPONSES TO PHQ QUESTIONS 1-9: 0
SUM OF ALL RESPONSES TO PHQ QUESTIONS 1-9: 0
1. LITTLE INTEREST OR PLEASURE IN DOING THINGS: 0
SUM OF ALL RESPONSES TO PHQ QUESTIONS 1-9: 0
2. FEELING DOWN, DEPRESSED OR HOPELESS: 0
SUM OF ALL RESPONSES TO PHQ QUESTIONS 1-9: 0

## 2023-04-24 NOTE — PROGRESS NOTES
MA Rooming Questions  Patient: Lubna Parra  MRN: [id number]    Date: 4/24/2023        1. Do you have any new issues?   no         2. Do you need any refills on medications?    no    3. Have you had any imaging done since your last visit? yes - VL dup    4. Have you been hospitalized or seen in the emergency room since your last visit here?   no    5. Did the patient have a depression screening completed today?  Yes    PHQ-9 Total Score: 0 (4/24/2023 10:57 AM)       PHQ-9 Given to (if applicable):               PHQ-9 Score (if applicable):                     [] Positive     []  Negative              Does question #9 need addressed (if applicable)                     [] Yes    []  No               Rubin Paris [facility]

## 2023-05-23 ENCOUNTER — OFFICE VISIT (OUTPATIENT)
Dept: CARDIOLOGY CLINIC | Age: 23
End: 2023-05-23
Payer: COMMERCIAL

## 2023-05-23 ENCOUNTER — TELEPHONE (OUTPATIENT)
Dept: CARDIOLOGY CLINIC | Age: 23
End: 2023-05-23

## 2023-05-23 VITALS
WEIGHT: 219 LBS | HEART RATE: 77 BPM | DIASTOLIC BLOOD PRESSURE: 72 MMHG | SYSTOLIC BLOOD PRESSURE: 120 MMHG | HEIGHT: 65 IN | BODY MASS INDEX: 36.49 KG/M2

## 2023-05-23 DIAGNOSIS — M79.89 LEG SWELLING: ICD-10-CM

## 2023-05-23 DIAGNOSIS — O22.30 DVT (DEEP VEIN THROMBOSIS) IN PREGNANCY: Primary | ICD-10-CM

## 2023-05-23 DIAGNOSIS — Z01.810 PRE-OPERATIVE CARDIOVASCULAR EXAMINATION: Primary | ICD-10-CM

## 2023-05-23 PROCEDURE — 93000 ELECTROCARDIOGRAM COMPLETE: CPT | Performed by: INTERNAL MEDICINE

## 2023-05-23 PROCEDURE — 99243 OFF/OP CNSLTJ NEW/EST LOW 30: CPT | Performed by: INTERNAL MEDICINE

## 2023-05-23 PROCEDURE — G8427 DOCREV CUR MEDS BY ELIG CLIN: HCPCS | Performed by: INTERNAL MEDICINE

## 2023-05-23 PROCEDURE — G8417 CALC BMI ABV UP PARAM F/U: HCPCS | Performed by: INTERNAL MEDICINE

## 2023-05-23 NOTE — TELEPHONE ENCOUNTER
Sneha Napoles Callahan     VENOGRAM [place] [address]      Patient Name: Joselyn Soto   FAO:[birth year]    YWE#[id number]    Date of Procedure: 6/13/23 Time: 8am Arrival Time: 6am    The catheterization and angiogram are usually outpatient procedures, however if stenting is needed you may need to stay overnight. You will need to arrive at the hospital two hours before the procedure. You will go to registration in the main lobby. You will need to arrange for someone to drive you home. HOSPITAL:  [facility])      X   If you have received orders for blood work and or a chest x-ray, please have them done on assigned date at [facility],  [facility], or [facility].     X Please do not have anything by mouth after midnight prior to or 8 hours before the procedure. X You may take your medications with a sip of water in the morning of your procedure or take them with you to the hospital                      X If you take  Eliquis, you should hold it for 48 hours before your procedure.

## 2023-05-23 NOTE — TELEPHONE ENCOUNTER
I received authorization for VENOGRAM on 5/23/23 12:02 PM via [address]

## 2023-05-23 NOTE — PROGRESS NOTES
CARDIOLOGY CONSULT NOTE   Reason for consultation:  leg leg DVT    Referring physician:  OSMAN Velasquez    Primary care physician: OSMAN Velasquez      Dear OSMAN Velasquez  Thanks for the consult. History of present illness: Tejas Salas is a 26 y. o.year old who  presents with presents with severe leg swelling for few months, its left leg swellings, for few  months, getting worse,contant swelling she has not been using  diuretics and its not getting better, she dose not use some cream or compression socks, ,her leg swelling does not gets better when she elevates her leg she has leg redness, she does not have leg pain, she does not have associated shortness of breath, fever, of chest pain, she was diagnosed with DVT during pregancy, she has been on lovenox during pregnancy and now on eliquisand her her leg swelling is nto getting better. Chief Complaint   Patient presents with    Referral - General     No new symptoms     Blood pressure, cholesterol, blood glucose and weight are well controlled. Past medical history:    has a past medical history of Anxiety, Arthritis, Depression, DVT (deep vein thrombosis) in pregnancy, DVT complicating pregnancy, third trimester, FH: juvenile rheumatoid arthritis, IBS (irritable bowel syndrome), Migraine, Obesity, and Urinary frequency. Past surgical history:   has a past surgical history that includes [place] tooth extraction and [place] tooth extraction (Bilateral). Social History:   reports that she has never smoked. She has never used smokeless tobacco. She reports that she does not currently use alcohol. She reports that she does not use drugs. Family history:   no family history of CAD, STROKE of DM    Allergies   Allergen Reactions    Pcn [Penicillins] Hives    Penicillins Hives       No current facility-administered medications for this visit.     Current Outpatient Medications   Medication Sig Dispense Refill    apixaban (ELIQUIS) 5 MG TABS tablet Take

## 2023-05-23 NOTE — TELEPHONE ENCOUNTER
Pt notified and confirmed procedure for 6/13/23 @8am, arrival time 6am. Instruction call scheduled for 6/8/23 @130pm.

## 2023-06-06 ENCOUNTER — PROCEDURE VISIT (OUTPATIENT)
Dept: CARDIOLOGY CLINIC | Age: 23
End: 2023-06-06
Payer: COMMERCIAL

## 2023-06-06 DIAGNOSIS — M79.89 LEG SWELLING: ICD-10-CM

## 2023-06-06 LAB
LV EF: 58 %
LVEF MODALITY: NORMAL

## 2023-06-06 PROCEDURE — 93306 TTE W/DOPPLER COMPLETE: CPT | Performed by: INTERNAL MEDICINE

## 2023-06-08 ENCOUNTER — TELEPHONE (OUTPATIENT)
Dept: CARDIOLOGY CLINIC | Age: 23
End: 2023-06-08

## 2023-06-08 ENCOUNTER — HOSPITAL ENCOUNTER (OUTPATIENT)
Age: 23
Discharge: HOME | End: 2023-06-08
Payer: COMMERCIAL

## 2023-06-08 ENCOUNTER — HOSPITAL ENCOUNTER (OUTPATIENT)
Dept: GENERAL RADIOLOGY | Age: 23
Discharge: HOME | End: 2023-06-08
Payer: COMMERCIAL

## 2023-06-08 DIAGNOSIS — Z01.810 PRE-OPERATIVE CARDIOVASCULAR EXAMINATION: ICD-10-CM

## 2023-06-08 LAB
ABO/RH: NORMAL
ANION GAP SERPL CALCULATED.3IONS-SCNC: 14 MMOL/L (ref 4–16)
ANTIBODY SCREEN: NEGATIVE
BUN SERPL-MCNC: 15 MG/DL (ref 6–23)
CALCIUM SERPL-MCNC: 8.5 MG/DL (ref 8.3–10.6)
CHLORIDE BLD-SCNC: 102 MMOL/L (ref 99–110)
CO2: 22 MMOL/L (ref 21–32)
CREAT SERPL-MCNC: 0.8 MG/DL (ref 0.6–1.1)
GFR SERPL CREATININE-BSD FRML MDRD: >60 ML/MIN/1.73M2
GLUCOSE SERPL-MCNC: 74 MG/DL (ref 70–99)
HCT VFR BLD CALC: 36.3 % (ref 37–47)
HEMOGLOBIN: 11.4 GM/DL (ref 12.5–16)
MCH RBC QN AUTO: 27.1 PG (ref 27–31)
MCHC RBC AUTO-ENTMCNC: 31.4 % (ref 32–36)
MCV RBC AUTO: 86.2 FL (ref 78–100)
PDW BLD-RTO: 13.9 % (ref 11.7–14.9)
PLATELET # BLD: 313 K/CU MM (ref 140–440)
PMV BLD AUTO: 11.4 FL (ref 7.5–11.1)
POTASSIUM SERPL-SCNC: 4.4 MMOL/L (ref 3.5–5.1)
RBC # BLD: 4.21 M/CU MM (ref 4.2–5.4)
SODIUM BLD-SCNC: 138 MMOL/L (ref 135–145)
WBC # BLD: 9 K/CU MM (ref 4–10.5)

## 2023-06-08 PROCEDURE — 86850 RBC ANTIBODY SCREEN: CPT

## 2023-06-08 PROCEDURE — 86901 BLOOD TYPING SEROLOGIC RH(D): CPT

## 2023-06-08 PROCEDURE — 80048 BASIC METABOLIC PNL TOTAL CA: CPT

## 2023-06-08 PROCEDURE — 71046 X-RAY EXAM CHEST 2 VIEWS: CPT

## 2023-06-08 PROCEDURE — 85027 COMPLETE CBC AUTOMATED: CPT

## 2023-06-08 PROCEDURE — 36415 COLL VENOUS BLD VENIPUNCTURE: CPT

## 2023-06-08 PROCEDURE — 86900 BLOOD TYPING SEROLOGIC ABO: CPT

## 2023-06-08 NOTE — TELEPHONE ENCOUNTER
Patient given instructions over telephone on Venogram for Dx: DVT. Procedure is scheduled for 6/13/23 @ 8am, w/arrival @ 6am, @ [facility]. Pre-admission orders are in [facility] for labwork and/or CXR, which are due 6/9/23 @ [facility]. Patient advised to review instructions given. Patient was notified that procedure date or time could be changed due to an emergency. Patient voiced understanding.

## 2023-06-09 ENCOUNTER — TELEPHONE (OUTPATIENT)
Dept: CARDIOLOGY CLINIC | Age: 23
End: 2023-06-09

## 2023-06-09 NOTE — TELEPHONE ENCOUNTER
Summary   Normal left ventricle structure and systolic function with an ejection   fraction of 55-60%. Essentially normal chamber sizes. Moderate tricuspid regurgitation. Mild pulmonary hypertension at 44 mmHg. No evidence of pericardial effusion. Discussed results with pt. Patient advised and voices understanding.

## 2023-06-13 ENCOUNTER — HOSPITAL ENCOUNTER (OUTPATIENT)
Dept: CARDIAC CATH/INVASIVE PROCEDURES | Age: 23
Discharge: HOME | End: 2023-06-13
Attending: INTERNAL MEDICINE | Admitting: INTERNAL MEDICINE
Payer: COMMERCIAL

## 2023-06-13 VITALS
BODY MASS INDEX: 36.65 KG/M2 | DIASTOLIC BLOOD PRESSURE: 92 MMHG | TEMPERATURE: 97.1 F | HEART RATE: 67 BPM | SYSTOLIC BLOOD PRESSURE: 117 MMHG | HEIGHT: 65 IN | WEIGHT: 220 LBS | RESPIRATION RATE: 16 BRPM | OXYGEN SATURATION: 98 %

## 2023-06-13 PROBLEM — I82.402 ACUTE DEEP VEIN THROMBOSIS (DVT) OF LEFT LOWER EXTREMITY (HCC): Status: ACTIVE | Noted: 2023-03-31

## 2023-06-13 LAB — PREGNANCY TEST URINE, POC: NEGATIVE

## 2023-06-13 PROCEDURE — 36005 INJECTION EXT VENOGRAPHY: CPT | Performed by: INTERNAL MEDICINE

## 2023-06-13 PROCEDURE — 36005 INJECTION EXT VENOGRAPHY: CPT

## 2023-06-13 PROCEDURE — 76937 US GUIDE VASCULAR ACCESS: CPT | Performed by: INTERNAL MEDICINE

## 2023-06-13 PROCEDURE — C1769 GUIDE WIRE: HCPCS

## 2023-06-13 PROCEDURE — 6360000004 HC RX CONTRAST MEDICATION

## 2023-06-13 PROCEDURE — 2580000003 HC RX 258: Performed by: INTERNAL MEDICINE

## 2023-06-13 PROCEDURE — 2500000003 HC RX 250 WO HCPCS

## 2023-06-13 PROCEDURE — C1894 INTRO/SHEATH, NON-LASER: HCPCS

## 2023-06-13 PROCEDURE — 75825 VEIN X-RAY TRUNK: CPT | Performed by: INTERNAL MEDICINE

## 2023-06-13 PROCEDURE — 6360000002 HC RX W HCPCS

## 2023-06-13 PROCEDURE — 75820 VEIN X-RAY ARM/LEG: CPT | Performed by: INTERNAL MEDICINE

## 2023-06-13 PROCEDURE — 6370000000 HC RX 637 (ALT 250 FOR IP): Performed by: INTERNAL MEDICINE

## 2023-06-13 PROCEDURE — 81025 URINE PREGNANCY TEST: CPT

## 2023-06-13 PROCEDURE — 75820 VEIN X-RAY ARM/LEG: CPT

## 2023-06-13 PROCEDURE — 2709999900 HC NON-CHARGEABLE SUPPLY

## 2023-06-13 RX ORDER — DIPHENHYDRAMINE HCL 25 MG
25 TABLET ORAL ONCE
Status: COMPLETED | OUTPATIENT
Start: 2023-06-13 | End: 2023-06-13

## 2023-06-13 RX ORDER — SODIUM CHLORIDE 9 MG/ML
INJECTION, SOLUTION INTRAVENOUS CONTINUOUS
Status: DISCONTINUED | OUTPATIENT
Start: 2023-06-13 | End: 2023-06-13 | Stop reason: HOSPADM

## 2023-06-13 RX ORDER — DIAZEPAM 5 MG/1
5 TABLET ORAL ONCE
Status: COMPLETED | OUTPATIENT
Start: 2023-06-13 | End: 2023-06-13

## 2023-06-13 RX ADMIN — SODIUM CHLORIDE: 9 INJECTION, SOLUTION INTRAVENOUS at 06:31

## 2023-06-13 RX ADMIN — DIPHENHYDRAMINE HYDROCHLORIDE 25 MG: 25 TABLET ORAL at 06:31

## 2023-06-13 RX ADMIN — DIAZEPAM 5 MG: 5 TABLET ORAL at 06:31

## 2023-06-13 NOTE — DISCHARGE INSTRUCTIONS
Discharge Home Instuctions  Name:Santa Orlando  TKZ:[birth year]    Your instructions:    Shower only for the first 5 days, NO TUB BATHS. Wash procedure site with mild soap, rinse and pat dry. Do not rub over the procedure site for two (2) days. Remove dressing and replace with a band-aid in 2 days. Site observations-Observe the area for bleeding or hematoma (lump under the skin caused by bleeding.)      A. Painless bruising is normal.  B. If a firmness/swelling seems to be increasing or there is bright red bleeding from site       (hold pressure over procedure site) and  CALL YOUR DOCTOR IMMEDIATELY. What to do after you leave the hospital:    Recommended activity: no lifting, Driving, or Strenuous exercise for 3 days. DO NOT lift more than 10lbs. For your procedure you may have been given a sedative to help you relax. This drug will make you sleepy. It is usually given in a vein (by IV). Don't do anything for 24 hours that requires attention to detail. It takes time for the medicine effects to completely wear off. Do not sign any legally binding contracts or documents over the next 24 hours. For your safety, you should not drive or operate any machinery that could be dangerous until the medicine wears off and you can think clearly and react easily. Follow-up with physician in 7-10 days. Take your medication as ordered.       If you have any questions, you may call [phone] or your physicians office

## 2023-06-15 NOTE — H&P
History of present illness: Sandra Adams is a 26 y. o.year old who  presents with presents with severe leg swelling for few months, its left leg swellings, for few  months, getting worse,contant swelling she has not been using  diuretics and its not getting better, she dose not use some cream or compression socks, ,her leg swelling does not gets better when she elevates her leg she has leg redness, she does not have leg pain, she does not have associated shortness of breath, fever, of chest pain, she was diagnosed with DVT during pregancy, she has been on lovenox during pregnancy and now on eliquisand her her leg swelling is nto getting better. Chief Complaint   Patient presents with    Referral - General       No new symptoms      Blood pressure, cholesterol, blood glucose and weight are well controlled. Past medical history:    has a past medical history of Anxiety, Arthritis, Depression, DVT (deep vein thrombosis) in pregnancy, DVT complicating pregnancy, third trimester, FH: juvenile rheumatoid arthritis, IBS (irritable bowel syndrome), Migraine, Obesity, and Urinary frequency. Past surgical history:   has a past surgical history that includes [place] tooth extraction and [place] tooth extraction (Bilateral). Social History:   reports that she has never smoked. She has never used smokeless tobacco. She reports that she does not currently use alcohol. She reports that she does not use drugs. Family history:   no family history of CAD, STROKE of DM          Allergies   Allergen Reactions    Pcn [Penicillins] Hives    Penicillins Hives           Current Meds Link used for Sign Out Report   No current facility-administered medications for this visit.       Current Facility-Administered Medications          Current Outpatient Medications   Medication Sig Dispense Refill    apixaban (ELIQUIS) 5 MG TABS tablet Take 1 tablet by mouth 2 times daily 60 tablet 3    buPROPion (WELLBUTRIN SR) 100 MG extended release

## 2023-06-27 ENCOUNTER — OFFICE VISIT (OUTPATIENT)
Dept: CARDIOLOGY CLINIC | Age: 23
End: 2023-06-27
Payer: COMMERCIAL

## 2023-06-27 VITALS
HEART RATE: 82 BPM | OXYGEN SATURATION: 99 % | BODY MASS INDEX: 37.32 KG/M2 | DIASTOLIC BLOOD PRESSURE: 74 MMHG | SYSTOLIC BLOOD PRESSURE: 130 MMHG | WEIGHT: 224 LBS | HEIGHT: 65 IN

## 2023-06-27 DIAGNOSIS — M79.89 LEG SWELLING: Primary | ICD-10-CM

## 2023-06-27 DIAGNOSIS — O22.30 DVT (DEEP VEIN THROMBOSIS) IN PREGNANCY: ICD-10-CM

## 2023-06-27 PROCEDURE — 99213 OFFICE O/P EST LOW 20 MIN: CPT | Performed by: INTERNAL MEDICINE

## 2023-06-27 PROCEDURE — G8417 CALC BMI ABV UP PARAM F/U: HCPCS | Performed by: INTERNAL MEDICINE

## 2023-06-27 PROCEDURE — G8427 DOCREV CUR MEDS BY ELIG CLIN: HCPCS | Performed by: INTERNAL MEDICINE

## 2023-06-27 PROCEDURE — 1036F TOBACCO NON-USER: CPT | Performed by: INTERNAL MEDICINE

## 2023-07-31 ENCOUNTER — TELEPHONE (OUTPATIENT)
Dept: ONCOLOGY | Age: 23
End: 2023-07-31

## 2023-07-31 NOTE — TELEPHONE ENCOUNTER
Left message with US time and prep to be done on 8/18/23 [facility] arrival at 10 AM. Informed to call with any questions.

## 2023-09-19 ENCOUNTER — HOSPITAL ENCOUNTER (OUTPATIENT)
Dept: ULTRASOUND IMAGING | Age: 23
Discharge: HOME | End: 2023-09-19
Attending: INTERNAL MEDICINE
Payer: COMMERCIAL

## 2023-09-19 DIAGNOSIS — O22.30 DVT (DEEP VEIN THROMBOSIS) IN PREGNANCY: ICD-10-CM

## 2023-09-19 DIAGNOSIS — I82.412 ACUTE DEEP VEIN THROMBOSIS (DVT) OF FEMORAL VEIN OF LEFT LOWER EXTREMITY (HCC): ICD-10-CM

## 2023-09-19 PROCEDURE — 93971 EXTREMITY STUDY: CPT

## 2023-09-24 NOTE — PROGRESS NOTES
Patient Name:  Nelia Carr  Patient :  2000  Patient MRN:  [id number]     Primary Oncologist: Rudolph Rosas MD  Referring Provider: OSMAN Barrientos     Date of Service: 2023       Chief Complaint:    Chief Complaint   Patient presents with    Follow-up    Results     Patient Active Problem List:     Labor and delivery indication for care or intervention     Leg swelling in pregnancy, third trimester     DVT (deep vein thrombosis) in pregnancy     Supervision of high risk pregnancy in third trimester     33 weeks gestation of pregnancy     Obesity affecting pregnancy in third trimester     Encounter for triage in pregnant patient     Encounter for induction of labor    HPI:   Marvin Linda is a 26year-old female who was diagnosed with acute DVT of left common femoral, deep femoral and femoral veins as well as the greater saphenous vein junction on 23. She was in her third trimester of pregnancy at that time. She has been on lovenox 1 mg/kg BID since then. She delivered her baby on 3/1/23. She is not planning to breast feed baby and she prefers to switch her [facility] therapy to oral form. She doesn't have previous history of VTE. Denies family history of VTE. She still has mild swelling in left lower extremity. We changed her AC therapy to eliquis starting from 3/3/23. She was released from hospital with an appointment to see me as an out patient. She is tolerating eliquis well and she doesn't encounter any major side effects from it. Since she still has significant left lower extremity swelling, I requested repeat doppler study to r/o progression of her DVT. It was done on 4/3/23 and it showed nonocclusive thrombus throughout the deep veins of the left lower extremity. This is favored to represent a decrease in thrombus burden from the prior exam, although evaluation of the calf veins is limited.      Left LE doppler done on 23 showed no evidence of DVT in the left

## 2023-09-25 ENCOUNTER — OFFICE VISIT (OUTPATIENT)
Dept: ONCOLOGY | Age: 23
End: 2023-09-25
Payer: COMMERCIAL

## 2023-09-25 ENCOUNTER — HOSPITAL ENCOUNTER (OUTPATIENT)
Dept: INFUSION THERAPY | Age: 23
Discharge: HOME | End: 2023-09-25
Payer: COMMERCIAL

## 2023-09-25 VITALS
SYSTOLIC BLOOD PRESSURE: 115 MMHG | WEIGHT: 230 LBS | HEART RATE: 84 BPM | BODY MASS INDEX: 38.32 KG/M2 | HEIGHT: 65 IN | DIASTOLIC BLOOD PRESSURE: 72 MMHG | OXYGEN SATURATION: 99 % | TEMPERATURE: 97.7 F

## 2023-09-25 DIAGNOSIS — O22.30 DVT (DEEP VEIN THROMBOSIS) IN PREGNANCY: Primary | ICD-10-CM

## 2023-09-25 DIAGNOSIS — I82.412 ACUTE DEEP VEIN THROMBOSIS (DVT) OF FEMORAL VEIN OF LEFT LOWER EXTREMITY (HCC): ICD-10-CM

## 2023-09-25 PROCEDURE — G8417 CALC BMI ABV UP PARAM F/U: HCPCS | Performed by: INTERNAL MEDICINE

## 2023-09-25 PROCEDURE — 99211 OFF/OP EST MAY X REQ PHY/QHP: CPT

## 2023-09-25 PROCEDURE — 1036F TOBACCO NON-USER: CPT | Performed by: INTERNAL MEDICINE

## 2023-09-25 PROCEDURE — 99213 OFFICE O/P EST LOW 20 MIN: CPT | Performed by: INTERNAL MEDICINE

## 2023-09-25 PROCEDURE — G8427 DOCREV CUR MEDS BY ELIG CLIN: HCPCS | Performed by: INTERNAL MEDICINE

## 2023-09-25 NOTE — PROGRESS NOTES
MA Rooming Questions  Patient: Tosin Perez  MRN: [id number]    Date: 9/25/2023        1. Do you have any new issues?   no         2. Do you need any refills on medications? No    3. Have you had any imaging done since your last visit? yes - Vl Dup 09/19    4. Have you been hospitalized or seen in the emergency room since your last visit here?   no    5. Did the patient have a depression screening completed today?  No    No data recorded     PHQ-9 Given to (if applicable):               PHQ-9 Score (if applicable):                     [] Positive     []  Negative              Does question #9 need addressed (if applicable)                     [] Yes    []  No               Caroline Pandya MA

## 2024-04-20 LAB — PAP SMEAR: NORMAL

## 2024-04-21 ENCOUNTER — HOSPITAL ENCOUNTER (EMERGENCY)
Age: 24
Discharge: HOME | End: 2024-04-21
Payer: COMMERCIAL

## 2024-04-21 ENCOUNTER — APPOINTMENT (OUTPATIENT)
Dept: CT IMAGING | Age: 24
End: 2024-04-21
Payer: COMMERCIAL

## 2024-04-21 VITALS
DIASTOLIC BLOOD PRESSURE: 71 MMHG | BODY MASS INDEX: 36.65 KG/M2 | HEIGHT: 65 IN | WEIGHT: 220 LBS | RESPIRATION RATE: 18 BRPM | SYSTOLIC BLOOD PRESSURE: 127 MMHG | TEMPERATURE: 97.8 F | OXYGEN SATURATION: 98 % | HEART RATE: 81 BPM

## 2024-04-21 DIAGNOSIS — R10.31 RIGHT LOWER QUADRANT ABDOMINAL PAIN: Primary | ICD-10-CM

## 2024-04-21 DIAGNOSIS — N83.201 CYST OF RIGHT OVARY: ICD-10-CM

## 2024-04-21 LAB
ALBUMIN SERPL-MCNC: 4.4 GM/DL (ref 3.4–5)
ALP BLD-CCNC: 73 IU/L (ref 40–128)
ALT SERPL-CCNC: 29 U/L (ref 10–40)
ANION GAP SERPL CALCULATED.3IONS-SCNC: 12 MMOL/L (ref 7–16)
AST SERPL-CCNC: 25 IU/L (ref 15–37)
BACTERIA: ABNORMAL /HPF
BASOPHILS ABSOLUTE: 0.1 K/CU MM
BASOPHILS RELATIVE PERCENT: 0.7 % (ref 0–1)
BILIRUB SERPL-MCNC: 0.4 MG/DL (ref 0–1)
BILIRUBIN URINE: NEGATIVE MG/DL
BLOOD, URINE: ABNORMAL
BUN SERPL-MCNC: 11 MG/DL (ref 6–23)
CALCIUM SERPL-MCNC: 8.8 MG/DL (ref 8.3–10.6)
CHLORIDE BLD-SCNC: 105 MMOL/L (ref 99–110)
CLARITY: CLEAR
CO2: 22 MMOL/L (ref 21–32)
COLOR: YELLOW
CREAT SERPL-MCNC: 0.8 MG/DL (ref 0.6–1.1)
DIFFERENTIAL TYPE: ABNORMAL
EOSINOPHILS ABSOLUTE: 0.2 K/CU MM
EOSINOPHILS RELATIVE PERCENT: 2.3 % (ref 0–3)
GFR SERPL CREATININE-BSD FRML MDRD: >90 ML/MIN/1.73M2
GLUCOSE SERPL-MCNC: 108 MG/DL (ref 70–99)
GLUCOSE, URINE: NEGATIVE MG/DL
HCG QUALITATIVE: NEGATIVE
HCT VFR BLD CALC: 39.1 % (ref 37–47)
HEMOGLOBIN: 12.2 GM/DL (ref 12.5–16)
IMMATURE NEUTROPHIL %: 0.4 % (ref 0–0.43)
KETONES, URINE: NEGATIVE MG/DL
LEUKOCYTE ESTERASE, URINE: ABNORMAL
LIPASE: 21 IU/L (ref 13–60)
LYMPHOCYTES ABSOLUTE: 1.7 K/CU MM
LYMPHOCYTES RELATIVE PERCENT: 18.8 % (ref 24–44)
MCH RBC QN AUTO: 26.8 PG (ref 27–31)
MCHC RBC AUTO-ENTMCNC: 31.2 % (ref 32–36)
MCV RBC AUTO: 85.7 FL (ref 78–100)
MONOCYTES ABSOLUTE: 0.6 K/CU MM
MONOCYTES RELATIVE PERCENT: 6.1 % (ref 0–4)
MUCUS: ABNORMAL HPF
NEUTROPHILS RELATIVE PERCENT: 71.7 % (ref 36–66)
NITRITE URINE, QUANTITATIVE: NEGATIVE
NUCLEATED RBC %: 0 %
PDW BLD-RTO: 13.4 % (ref 11.7–14.9)
PH, URINE: 6 (ref 5–8)
PLATELET # BLD: 297 K/CU MM (ref 140–440)
PMV BLD AUTO: 10.9 FL (ref 7.5–11.1)
POTASSIUM SERPL-SCNC: 4.8 MMOL/L (ref 3.5–5.1)
PROTEIN UA: NEGATIVE MG/DL
RBC # BLD: 4.56 M/CU MM (ref 4.2–5.4)
RBC URINE: 12 /HPF (ref 0–6)
SEGMENTED NEUTROPHILS ABSOLUTE COUNT: 6.6 K/CU MM
SODIUM BLD-SCNC: 139 MMOL/L (ref 135–145)
SPECIFIC GRAVITY UA: >1.03 (ref 1–1.03)
SQUAMOUS EPITHELIAL: 5 /HPF
TOTAL IMMATURE NEUTOROPHIL: 0.04 K/CU MM
TOTAL NUCLEATED RBC: 0 K/CU MM
TOTAL PROTEIN: 7.4 GM/DL (ref 6.4–8.2)
TRICHOMONAS: ABNORMAL /HPF
UROBILINOGEN, URINE: 0.2 MG/DL (ref 0.2–1)
WBC # BLD: 9.2 K/CU MM (ref 4–10.5)
WBC UA: 16 /HPF (ref 0–5)

## 2024-04-21 PROCEDURE — 74177 CT ABD & PELVIS W/CONTRAST: CPT

## 2024-04-21 PROCEDURE — 6360000004 HC RX CONTRAST MEDICATION: Performed by: PHYSICIAN ASSISTANT

## 2024-04-21 PROCEDURE — 84703 CHORIONIC GONADOTROPIN ASSAY: CPT

## 2024-04-21 PROCEDURE — 81001 URINALYSIS AUTO W/SCOPE: CPT

## 2024-04-21 PROCEDURE — 96374 THER/PROPH/DIAG INJ IV PUSH: CPT

## 2024-04-21 PROCEDURE — 96375 TX/PRO/DX INJ NEW DRUG ADDON: CPT

## 2024-04-21 PROCEDURE — 85025 COMPLETE CBC W/AUTO DIFF WBC: CPT

## 2024-04-21 PROCEDURE — 80053 COMPREHEN METABOLIC PANEL: CPT

## 2024-04-21 PROCEDURE — 99285 EMERGENCY DEPT VISIT HI MDM: CPT

## 2024-04-21 PROCEDURE — 83690 ASSAY OF LIPASE: CPT

## 2024-04-21 PROCEDURE — 6360000002 HC RX W HCPCS: Performed by: PHYSICIAN ASSISTANT

## 2024-04-21 RX ORDER — KETOROLAC TROMETHAMINE 15 MG/ML
30 INJECTION, SOLUTION INTRAMUSCULAR; INTRAVENOUS ONCE
Status: COMPLETED | OUTPATIENT
Start: 2024-04-21 | End: 2024-04-21

## 2024-04-21 RX ORDER — FENTANYL CITRATE 50 UG/ML
25 INJECTION, SOLUTION INTRAMUSCULAR; INTRAVENOUS ONCE
Status: COMPLETED | OUTPATIENT
Start: 2024-04-21 | End: 2024-04-21

## 2024-04-21 RX ORDER — MELOXICAM 7.5 MG/1
7.5 TABLET ORAL 2 TIMES DAILY
Qty: 30 TABLET | Refills: 0 | Status: SHIPPED | OUTPATIENT
Start: 2024-04-21

## 2024-04-21 RX ORDER — ONDANSETRON 2 MG/ML
4 INJECTION INTRAMUSCULAR; INTRAVENOUS EVERY 30 MIN PRN
Status: DISCONTINUED | OUTPATIENT
Start: 2024-04-21 | End: 2024-04-21 | Stop reason: HOSPADM

## 2024-04-21 RX ORDER — HYDROCODONE BITARTRATE AND ACETAMINOPHEN 5; 325 MG/1; MG/1
1 TABLET ORAL EVERY 6 HOURS PRN
Qty: 10 TABLET | Refills: 0 | Status: SHIPPED | OUTPATIENT
Start: 2024-04-21 | End: 2024-04-26

## 2024-04-21 RX ADMIN — KETOROLAC TROMETHAMINE 30 MG: 15 INJECTION, SOLUTION INTRAMUSCULAR; INTRAVENOUS at 12:18

## 2024-04-21 RX ADMIN — IOPAMIDOL 75 ML: 755 INJECTION, SOLUTION INTRAVENOUS at 12:56

## 2024-04-21 RX ADMIN — ONDANSETRON 4 MG: 2 INJECTION INTRAMUSCULAR; INTRAVENOUS at 12:18

## 2024-04-21 RX ADMIN — FENTANYL CITRATE 25 MCG: 50 INJECTION INTRAMUSCULAR; INTRAVENOUS at 12:19

## 2024-04-21 ASSESSMENT — PAIN DESCRIPTION - ORIENTATION: ORIENTATION: RIGHT;ANTERIOR

## 2024-04-21 ASSESSMENT — PAIN SCALES - GENERAL
PAINLEVEL_OUTOF10: 6
PAINLEVEL_OUTOF10: 6

## 2024-04-21 ASSESSMENT — PAIN - FUNCTIONAL ASSESSMENT
PAIN_FUNCTIONAL_ASSESSMENT: 0-10
PAIN_FUNCTIONAL_ASSESSMENT: PREVENTS OR INTERFERES WITH MANY ACTIVE NOT PASSIVE ACTIVITIES

## 2024-04-21 ASSESSMENT — PAIN DESCRIPTION - LOCATION: LOCATION: UMBILICUS

## 2024-04-21 NOTE — DISCHARGE INSTRUCTIONS
Your CT scan findings showed a simple right sided ovarian cyst measuring 3 cm-likely a functional cyst.  Lab work was overall reassuring.  Will advise close monitoring symptoms and strict return precautions over the next 24 hours for any new or developing symptoms/ worsening pain- will advise following up with OB/GYN for further long term management if these ovarian cyst become more of a recurring issue.

## 2024-04-21 NOTE — ED PROVIDER NOTES
EMERGENCY DEPARTMENT ENCOUNTER        Pt Name: Santa Orlando  MRN: [id number]  Birthdate 2000  Date of evaluation: 4/21/2024  Provider: EDWINA BETTENCOURT  PCP: Harper Steiner CNNP (Inactive)    JUAN. I have evaluated this patient.      Triage CHIEF COMPLAINT       Chief Complaint   Patient presents with    Abdominal Pain     HISTORY OF PRESENT ILLNESS      Chief Complaint: Right lower quadrant abdominal pain    Santa Orlando is a 24 y.o.  female who presents to the ED with a chief complaint of continued right lower quadrant abdominal pain.  States this started yesterday morning, describes a sharp/crampy pain into the right abdominal pain.  Patient states that she was seen and evaluated by urgent care yesterday, was told that \"she has an early appendicitis\".  Was sent home with Zofran.  Does admit that she had some \"fullness\" to her bladder area but there is no signs of urinary tract infection, denies any other urinary symptoms.  Denies pregnancy, no other vaginal symptoms.  No discharge, no concern for infection.  No change in bowel habits, no fevers and chills.  No nausea or vomiting.  Patient does have history of rheumatoid arthritis, she is on NSAIDs but not does take them regularly.  Patient denies any chest pain shortness of breath.  No cough or congestion no infectious symptoms, no other recent sick contacts.    Nursing Notes were all reviewed and agreed with or any disagreements were addressed in the HPI.    REVIEW OF SYSTEMS     At least 10 systems reviewed and otherwise acutely negative except as in the [place].     PAST MEDICAL HISTORY     Past Medical History:   Diagnosis Date    Anxiety     Arthritis     Depression     DVT (deep vein thrombosis) in pregnancy     LLE    DVT complicating pregnancy, third trimester     LLE    FH: juvenile rheumatoid arthritis     IBS (irritable bowel syndrome)     Migraine     Obesity     Urinary frequency     Venogram 06/13/2023    chronic left common iliac

## 2025-01-13 ENCOUNTER — APPOINTMENT (OUTPATIENT)
Dept: ULTRASOUND IMAGING | Age: 25
End: 2025-01-13
Payer: COMMERCIAL

## 2025-01-13 ENCOUNTER — HOSPITAL ENCOUNTER (EMERGENCY)
Age: 25
Discharge: HOME | End: 2025-01-13
Payer: COMMERCIAL

## 2025-01-13 VITALS
WEIGHT: 246 LBS | RESPIRATION RATE: 18 BRPM | TEMPERATURE: 97.7 F | OXYGEN SATURATION: 99 % | BODY MASS INDEX: 40.94 KG/M2 | DIASTOLIC BLOOD PRESSURE: 84 MMHG | HEART RATE: 91 BPM | SYSTOLIC BLOOD PRESSURE: 133 MMHG

## 2025-01-13 DIAGNOSIS — R60.0 LEG EDEMA: Primary | ICD-10-CM

## 2025-01-13 DIAGNOSIS — Z3A.01 LESS THAN 8 WEEKS GESTATION OF PREGNANCY: ICD-10-CM

## 2025-01-13 LAB
ALBUMIN SERPL-MCNC: 4.1 G/DL (ref 3.4–5)
ALBUMIN/GLOB SERPL: 1.5 {RATIO} (ref 1.1–2.2)
ALP SERPL-CCNC: 74 U/L (ref 40–129)
ALT SERPL-CCNC: 27 U/L (ref 10–40)
ANION GAP SERPL CALCULATED.3IONS-SCNC: 11 MMOL/L (ref 9–17)
AST SERPL-CCNC: 24 U/L (ref 15–37)
BASOPHILS # BLD: 0.06 K/UL
BASOPHILS NFR BLD: 1 % (ref 0–1)
BILIRUB SERPL-MCNC: 0.4 MG/DL (ref 0–1)
BUN SERPL-MCNC: 10 MG/DL (ref 7–20)
CALCIUM SERPL-MCNC: 8.7 MG/DL (ref 8.3–10.6)
CHLORIDE SERPL-SCNC: 107 MMOL/L (ref 99–110)
CO2 SERPL-SCNC: 22 MMOL/L (ref 21–32)
CREAT SERPL-MCNC: 0.7 MG/DL (ref 0.6–1.1)
EOSINOPHIL # BLD: 0.24 K/UL
EOSINOPHILS RELATIVE PERCENT: 2 % (ref 0–3)
ERYTHROCYTE [DISTWIDTH] IN BLOOD BY AUTOMATED COUNT: 14.2 % (ref 11.7–14.9)
GFR, ESTIMATED: >90 ML/MIN/1.73M2
GLUCOSE SERPL-MCNC: 131 MG/DL (ref 74–99)
HCT VFR BLD AUTO: 36.2 % (ref 37–47)
HGB BLD-MCNC: 11.7 G/DL (ref 12.5–16)
IMM GRANULOCYTES # BLD AUTO: 0.03 K/UL
IMM GRANULOCYTES NFR BLD: 0 %
INR PPP: 1
LYMPHOCYTES NFR BLD: 2.76 K/UL
LYMPHOCYTES RELATIVE PERCENT: 26 % (ref 24–44)
MCH RBC QN AUTO: 27.7 PG (ref 27–31)
MCHC RBC AUTO-ENTMCNC: 32.3 G/DL (ref 32–36)
MCV RBC AUTO: 85.6 FL (ref 78–100)
MONOCYTES NFR BLD: 0.72 K/UL
MONOCYTES NFR BLD: 7 % (ref 0–4)
NEUTROPHILS NFR BLD: 64 % (ref 36–66)
NEUTS SEG NFR BLD: 6.71 K/UL
PARTIAL THROMBOPLASTIN TIME: 28.8 SEC (ref 25.1–37.1)
PLATELET # BLD AUTO: 288 K/UL (ref 140–440)
PMV BLD AUTO: 10.6 FL (ref 7.5–11.1)
POTASSIUM SERPL-SCNC: 3.9 MMOL/L (ref 3.5–5.1)
PROT SERPL-MCNC: 6.8 G/DL (ref 6.4–8.2)
PROTHROMBIN TIME: 13.5 SEC (ref 11.7–14.5)
RBC # BLD AUTO: 4.23 M/UL (ref 4.2–5.4)
SODIUM SERPL-SCNC: 139 MMOL/L (ref 136–145)
WBC OTHER # BLD: 10.5 K/UL (ref 4–10.5)

## 2025-01-13 PROCEDURE — 99284 EMERGENCY DEPT VISIT MOD MDM: CPT

## 2025-01-13 PROCEDURE — 85610 PROTHROMBIN TIME: CPT

## 2025-01-13 PROCEDURE — 36415 COLL VENOUS BLD VENIPUNCTURE: CPT

## 2025-01-13 PROCEDURE — 85025 COMPLETE CBC W/AUTO DIFF WBC: CPT

## 2025-01-13 PROCEDURE — 85730 THROMBOPLASTIN TIME PARTIAL: CPT

## 2025-01-13 PROCEDURE — 93970 EXTREMITY STUDY: CPT

## 2025-01-13 PROCEDURE — 80053 COMPREHEN METABOLIC PANEL: CPT

## 2025-01-13 RX ORDER — ENOXAPARIN SODIUM 150 MG/ML
1 INJECTION SUBCUTANEOUS EVERY 12 HOURS
Qty: 43.8 ML | Refills: 1 | Status: SHIPPED | OUTPATIENT
Start: 2025-01-13 | End: 2025-02-12

## 2025-01-13 ASSESSMENT — PAIN SCALES - GENERAL
PAINLEVEL_OUTOF10: 4
PAINLEVEL_OUTOF10: 4

## 2025-01-13 ASSESSMENT — PAIN - FUNCTIONAL ASSESSMENT
PAIN_FUNCTIONAL_ASSESSMENT: PREVENTS OR INTERFERES SOME ACTIVE ACTIVITIES AND ADLS
PAIN_FUNCTIONAL_ASSESSMENT: 0-10
PAIN_FUNCTIONAL_ASSESSMENT: 0-10

## 2025-01-13 ASSESSMENT — PAIN DESCRIPTION - ORIENTATION: ORIENTATION: RIGHT;LEFT

## 2025-01-13 ASSESSMENT — PAIN DESCRIPTION - LOCATION
LOCATION: LEG
LOCATION: LEG

## 2025-01-13 ASSESSMENT — PAIN DESCRIPTION - DESCRIPTORS: DESCRIPTORS: SORE;PRESSURE

## 2025-01-14 NOTE — ED PROVIDER NOTES
I personally saw Santa Orlando and made/approved the management plan and take responsibility for the patient management.    In brief, patient is a G3, P1, A1 patient who is approximately 1 month pregnant who presents emergency department today with lower extremity swelling.  Patient has a history of DVT during her previous pregnancy, she was anticoagulated for that, but has not been anticoagulated right now.  Her last period was 12/9/2024.  Patient called her OB/GYN for leg swelling, they recommended her come in for ultrasound of her extremities because of her history of DVT.  She denies any chest pain or shortness of breath.  No abdominal pain or vaginal bleeding.  Has not had ultrasound to confirm IUP.    Focused exam revealed   General- no acute distress, alert, cooperative  Skin -intact, no rashes  Respiratory -no respiratory distress, speaking full sentences  MSK: No acute deformities, bilateral lower extremity +1 edema  Abdomen: Nondistended, nontender  Psych: Mood and affect appropriate      ED course:   Patient came in with lower extremity swelling and pain as described above.  She has a history of DVT during pregnancy.  Has seen hematology and recommended for anticoagulation during her future pregnancies.  She is currently not on any anticoagulation.  We will get that ultrasound of her lower extremities and start patient on anticoagulation and have patient follow-up with her OB/GYN next week as she has scheduled.  I have low suspicion for VTE as she has no chest pain, no shortness of breath, no syncope, no hemoptysis.    Final Impression:  1. Leg edema    2. Less than 8 weeks gestation of pregnancy      DISPOSITION Decision To Discharge 01/13/2025 09:26:45 PM   DISPOSITION CONDITION Stable           All diagnostic, treatment, and disposition decisions were made by myself in conjunction with the advanced practice provider.    For all further details of the patient's emergency department visit, please see the

## 2025-01-14 NOTE — ED PROVIDER NOTES
[facility] EMERGENCY DEPARTMENT  EMERGENCY DEPARTMENT ENCOUNTER        Pt Name: Santa Orlando  MRN: [id number]  Birthdate 2000  Date of evaluation: 1/13/2025  Provider: KYRA DIXON - WILLIAM  PCP: Harper Steiner CNNP (Inactive)     I have seen and evaluated this patient with my supervising physician Roddy Cervantes DO.      Triage CHIEF COMPLAINT       Chief Complaint   Patient presents with    Leg Swelling     BL. Reports whole leg hurts to touch or walk. This being for both legs. 5 weeks pregnant         HISTORY OF PRESENT ILLNESS      Chief Complaint: Lower extremity pain and swelling    Santa Orlando is a 24 y.o. female who presents for evaluation of bilateral lower extremity pain and swelling for the last 4 days.  Patient states she was sent in by her gynecologist today to rule out DVT.  She had a history of DVT in her last pregnancy.  Was followed with Dr. Hernandez, states she had a workup for blood clots but they did not find anything and they took her off anticoagulation a few months after her pregnancy ended in 2023.  She is about 5 weeks pregnant at this time.  Denies any chest pain or shortness of breath.  Denies any abdominal pain or vaginal bleeding.    Nursing Notes were all reviewed and agreed with or any disagreements were addressed in the HPI.    REVIEW OF SYSTEMS     Pertinent ROS as noted in HPI.      PAST MEDICAL HISTORY     Past Medical History:   Diagnosis Date    Anxiety     Arthritis     Depression     DVT (deep vein thrombosis) in pregnancy     LLE    DVT complicating pregnancy, third trimester     LLE    FH: juvenile rheumatoid arthritis     IBS (irritable bowel syndrome)     Migraine     Obesity     Urinary frequency     Venogram 06/13/2023    chronic left common iliac vein DVT,flow is patent, continue anticoagulation       SURGICAL HISTORY     Past Surgical History:   Procedure Laterality Date    WISDOM TOOTH EXTRACTION      WISDOM TOOTH EXTRACTION

## 2025-01-26 NOTE — PROGRESS NOTES
Patient Name:  Santa Orlando  Patient :  2000  Patient MRN:  [id number]     Primary Oncologist: Corey Hernandez MD  Referring Provider: Harper Steiner CNNP (Inactive)     Date of Service: 2025       Chief Complaint:    Chief Complaint   Patient presents with    Follow-up     Patient Active Problem List:     Labor and delivery indication for care or intervention     Leg swelling in pregnancy, third trimester     DVT (deep vein thrombosis) in pregnancy     Supervision of high risk pregnancy in third trimester     33 weeks gestation of pregnancy     Obesity affecting pregnancy in third trimester     Encounter for triage in pregnant patient     Encounter for induction of labor    HPI:   Santa Orlando is a 23 year-old female who was diagnosed with acute DVT of left common femoral, deep femoral and femoral veins as well as the greater saphenous vein junction on 23. She was in her third trimester of pregnancy at that time.      She has been on lovenox 1 mg/kg BID since then. She delivered her baby on 3/1/23.      She is not planning to breast feed baby and she prefers to switch her AC therapy to oral form.      She doesn't have previous history of VTE. Denies family history of VTE.      She still has mild swelling in left lower extremity. We changed her AC therapy to eliquis starting from 3/3/23.     She was released from hospital with an appointment to see me as an out patient.     She is tolerating eliquis well and she doesn't encounter any major side effects from it.     Since she still has significant left lower extremity swelling, I requested repeat doppler study to r/o progression of her DVT.     It was done on 4/3/23 and it showed nonocclusive thrombus throughout the deep veins of the left lower extremity. This is favored to represent a decrease in thrombus burden from the prior exam, although evaluation of the calf veins is limited.     Left LE doppler done on 23 showed no evidence of DVT in the

## 2025-01-27 ENCOUNTER — HOSPITAL ENCOUNTER (OUTPATIENT)
Dept: INFUSION THERAPY | Age: 25
Discharge: HOME | End: 2025-01-27
Payer: COMMERCIAL

## 2025-01-27 ENCOUNTER — OFFICE VISIT (OUTPATIENT)
Dept: ONCOLOGY | Age: 25
End: 2025-01-27
Payer: COMMERCIAL

## 2025-01-27 VITALS
TEMPERATURE: 97.8 F | WEIGHT: 240.8 LBS | HEART RATE: 72 BPM | HEIGHT: 65 IN | SYSTOLIC BLOOD PRESSURE: 118 MMHG | RESPIRATION RATE: 16 BRPM | DIASTOLIC BLOOD PRESSURE: 64 MMHG | BODY MASS INDEX: 40.12 KG/M2 | OXYGEN SATURATION: 100 %

## 2025-01-27 DIAGNOSIS — I82.412 ACUTE DEEP VEIN THROMBOSIS (DVT) OF FEMORAL VEIN OF LEFT LOWER EXTREMITY (HCC): Primary | ICD-10-CM

## 2025-01-27 LAB
ESTIMATED AVERAGE GLUCOSE: NORMAL
HBA1C MFR BLD: NORMAL %
T4 FREE: NORMAL
TSH SERPL DL<=0.05 MIU/L-ACNC: NORMAL M[IU]/L

## 2025-01-27 PROCEDURE — 99211 OFF/OP EST MAY X REQ PHY/QHP: CPT

## 2025-01-27 PROCEDURE — 99214 OFFICE O/P EST MOD 30 MIN: CPT | Performed by: INTERNAL MEDICINE

## 2025-01-27 RX ORDER — LEVOTHYROXINE SODIUM 175 UG/1
TABLET ORAL
COMMUNITY

## 2025-01-27 RX ORDER — ACETAMINOPHEN AND CODEINE PHOSPHATE 120; 12 MG/5ML; MG/5ML
1 SOLUTION ORAL DAILY
COMMUNITY
End: 2025-01-27

## 2025-01-27 RX ORDER — FLUOXETINE 40 MG/1
40 CAPSULE ORAL DAILY
COMMUNITY
Start: 2024-12-11

## 2025-01-27 NOTE — PROGRESS NOTES
MA Rooming Questions  Patient: Santa Orlando  MRN: [id number]    Date: 1/27/2025        1. Do you have any new issues?   no         2. Do you need any refills on medications?    no    3. Have you had any imaging done since your last visit?   yes -     4. Have you been hospitalized or seen in the emergency room since your last visit here?   yes - ER    5. Did the patient have a depression screening completed today? No    No data recorded     PHQ-9 Given to (if applicable):               PHQ-9 Score (if applicable):                     [] Positive     []  Negative              Does question #9 need addressed (if applicable)                     [] Yes    []  No               Magda Kumari CMA

## 2025-03-05 LAB
T4 FREE: NORMAL
TSH SERPL DL<=0.05 MIU/L-ACNC: NORMAL M[IU]/L

## 2025-03-13 ENCOUNTER — HOSPITAL ENCOUNTER (OUTPATIENT)
Dept: ULTRASOUND IMAGING | Age: 25
Discharge: HOME | End: 2025-03-13
Payer: COMMERCIAL

## 2025-03-13 VITALS
WEIGHT: 243 LBS | RESPIRATION RATE: 16 BRPM | HEART RATE: 87 BPM | DIASTOLIC BLOOD PRESSURE: 78 MMHG | HEIGHT: 65 IN | BODY MASS INDEX: 40.48 KG/M2 | SYSTOLIC BLOOD PRESSURE: 132 MMHG | TEMPERATURE: 98.2 F

## 2025-03-13 DIAGNOSIS — O99.281 HYPOTHYROID IN PREGNANCY, ANTEPARTUM, FIRST TRIMESTER: ICD-10-CM

## 2025-03-13 DIAGNOSIS — E03.9 HYPOTHYROID IN PREGNANCY, ANTEPARTUM, FIRST TRIMESTER: ICD-10-CM

## 2025-03-13 PROCEDURE — 76801 OB US < 14 WKS SINGLE FETUS: CPT

## 2025-03-13 RX ORDER — ENOXAPARIN SODIUM 300 MG/3ML
100 INJECTION INTRAVENOUS; SUBCUTANEOUS DAILY
COMMUNITY

## 2025-03-13 NOTE — PROGRESS NOTES
Patient arrived ambulatory to [facility] clinic. Patient has no pregnancy complaints. VS taken. History, medications and allergies reviewed. Ultrasound technician notified of arrival.

## 2025-03-13 NOTE — CONSULTS
[place] MATERNAL FETAL MEDICINE  3/13/2025     Referring Clinician: EDWINA Celis    Indications:  Hypothyroidism  A. Taking  200 mcg levothyroxine daily  Rheumatoid arthritis  History of DVT in left groin over a year ago   taking enoxaparin 100 mg of daily    History:  This 25 y.o.  with Estimated Date of Delivery: 9/15/2025 and a gestational age of 13w2d presented for evaluation today. She developed a DVT in her prior pregnancy and was started on enoxaparin.  She stopped after the pregnancy and was restarted when she found out she was pregnant. Dr. Hernandez is following her for this.    She has hypothyroidism and is taking 200 mcg of levothyroxine daily.  She is not taking it at the same time as prenatal vitamins.    She states she has rheumatoid arthritis but was told by her rheumatologist that treatment during pregnancy may not be safe. If, they are considering hydroxychloroquine, it is safe for use during gestation    Exam:  /78, BMI = 40.5  /78   Pulse 87   Temp 98.2 °F (36.8 °C) (Oral)   Resp 16   Ht 1.651 m (5' 5\")   Wt 110.2 kg (243 lb)   LMP 2024   BMI 40.44 kg/m²      Ultrasound Findings:  We identified a single intrauterine pregnancy in an aquatic presentation with an anterior placenta and a subjectively normal volume of amniotic fluid.  The crown-rump length confirms her menstrual dating.  The crown-rump length is 7.17 cm.  The fetus is active.    Impressions:  Intrauterine pregnancy at 13w3d with Estimated Date of Delivery: 9/15/25  Ultrasound confirms EDC and menstrual dating  History of right groin DVT  Hypothyroidism  Possible rheumatoid arthritis    Recommendations:  Enoxaparin 1 mg/kg subcutaneously every 12 hours and continue until 6 weeks postpartum  If the patient shows active DVT, continue enoxaparin and refer to hematologist  Check TSH, free T4, and thyroid peroxidase antibodies  Check levels every 4-6 weeks.  Do NOT take thyroid medication within 2 hours of

## 2025-03-24 ENCOUNTER — TELEPHONE (OUTPATIENT)
Dept: ONCOLOGY | Age: 25
End: 2025-03-24

## 2025-03-24 NOTE — TELEPHONE ENCOUNTER
Kacey LUU from [facility] called, [facility] would like to increase lovenox from once daily to two daily.  Please advise if agreeable with Dr. Hernandez   [phone]

## 2025-03-24 NOTE — TELEPHONE ENCOUNTER
This nurse also received a [employer] message from the patient and a message has been sent to the physician, awaiting additional orders. The patient will be contacted once those are received.

## 2025-03-24 NOTE — TELEPHONE ENCOUNTER
This nurse called and left a VM for the office @ [phone] advising that if they wish to increase to twice daily Dr Hernandez is agreeable to this. This RN's direct number provided if any additional questions or needs.

## 2025-03-26 DIAGNOSIS — O22.30 DVT (DEEP VEIN THROMBOSIS) IN PREGNANCY: Primary | ICD-10-CM

## 2025-03-26 RX ORDER — ENOXAPARIN SODIUM 100 MG/ML
100 INJECTION SUBCUTANEOUS 2 TIMES DAILY
Qty: 60 EACH | Refills: 4 | Status: SHIPPED | OUTPATIENT
Start: 2025-03-26

## 2025-03-28 LAB
RHEUMATOID FACTOR: NORMAL
T4 FREE: NORMAL
TSH SERPL DL<=0.05 MIU/L-ACNC: NORMAL M[IU]/L

## 2025-04-15 ENCOUNTER — HOSPITAL ENCOUNTER (EMERGENCY)
Age: 25
Discharge: HOME | End: 2025-04-15
Attending: STUDENT IN AN ORGANIZED HEALTH CARE EDUCATION/TRAINING PROGRAM
Payer: COMMERCIAL

## 2025-04-15 ENCOUNTER — APPOINTMENT (OUTPATIENT)
Dept: GENERAL RADIOLOGY | Age: 25
End: 2025-04-15
Payer: COMMERCIAL

## 2025-04-15 VITALS
OXYGEN SATURATION: 100 % | TEMPERATURE: 97.5 F | HEIGHT: 65 IN | RESPIRATION RATE: 18 BRPM | SYSTOLIC BLOOD PRESSURE: 131 MMHG | HEART RATE: 85 BPM | BODY MASS INDEX: 39.15 KG/M2 | WEIGHT: 235 LBS | DIASTOLIC BLOOD PRESSURE: 75 MMHG

## 2025-04-15 DIAGNOSIS — J45.901 REACTIVE AIRWAY DISEASE WITH ACUTE EXACERBATION, UNSPECIFIED ASTHMA SEVERITY, UNSPECIFIED WHETHER PERSISTENT: Primary | ICD-10-CM

## 2025-04-15 LAB
ALBUMIN SERPL-MCNC: 3.4 G/DL (ref 3.4–5)
ALBUMIN/GLOB SERPL: 1.4 {RATIO} (ref 1.1–2.2)
ALP SERPL-CCNC: 86 U/L (ref 40–129)
ALT SERPL-CCNC: 19 U/L (ref 10–40)
ANION GAP SERPL CALCULATED.3IONS-SCNC: 9 MMOL/L (ref 9–17)
AST SERPL-CCNC: 15 U/L (ref 15–37)
BASOPHILS # BLD: 0.03 K/UL
BASOPHILS NFR BLD: 0 % (ref 0–1)
BILIRUB SERPL-MCNC: <0.2 MG/DL (ref 0–1)
BUN SERPL-MCNC: 9 MG/DL (ref 7–20)
CALCIUM SERPL-MCNC: 8.5 MG/DL (ref 8.3–10.6)
CHLORIDE SERPL-SCNC: 106 MMOL/L (ref 99–110)
CO2 SERPL-SCNC: 19 MMOL/L (ref 21–32)
CREAT SERPL-MCNC: 0.6 MG/DL (ref 0.6–1.1)
EKG ATRIAL RATE: 82 BPM
EKG DIAGNOSIS: NORMAL
EKG P AXIS: 33 DEGREES
EKG P-R INTERVAL: 128 MS
EKG Q-T INTERVAL: 392 MS
EKG QRS DURATION: 76 MS
EKG QTC CALCULATION (BAZETT): 457 MS
EKG R AXIS: 35 DEGREES
EKG T AXIS: 24 DEGREES
EKG VENTRICULAR RATE: 82 BPM
EOSINOPHIL # BLD: 0.21 K/UL
EOSINOPHILS RELATIVE PERCENT: 2 % (ref 0–3)
ERYTHROCYTE [DISTWIDTH] IN BLOOD BY AUTOMATED COUNT: 14.3 % (ref 11.7–14.9)
GFR, ESTIMATED: >90 ML/MIN/1.73M2
GLUCOSE SERPL-MCNC: 87 MG/DL (ref 74–99)
HCT VFR BLD AUTO: 35.4 % (ref 37–47)
HGB BLD-MCNC: 11.8 G/DL (ref 12.5–16)
IMM GRANULOCYTES # BLD AUTO: 0.07 K/UL
IMM GRANULOCYTES NFR BLD: 1 %
LIPASE SERPL-CCNC: 24 U/L (ref 13–60)
LYMPHOCYTES NFR BLD: 1.95 K/UL
LYMPHOCYTES RELATIVE PERCENT: 16 % (ref 24–44)
MAGNESIUM SERPL-MCNC: 1.8 MG/DL (ref 1.8–2.4)
MCH RBC QN AUTO: 29.1 PG (ref 27–31)
MCHC RBC AUTO-ENTMCNC: 33.3 G/DL (ref 32–36)
MCV RBC AUTO: 87.2 FL (ref 78–100)
MONOCYTES NFR BLD: 0.79 K/UL
MONOCYTES NFR BLD: 7 % (ref 0–4)
NEUTROPHILS NFR BLD: 75 % (ref 36–66)
NEUTS SEG NFR BLD: 9.03 K/UL
PLATELET # BLD AUTO: 231 K/UL (ref 140–440)
PMV BLD AUTO: 11.6 FL (ref 7.5–11.1)
POTASSIUM SERPL-SCNC: 3.9 MMOL/L (ref 3.5–5.1)
PROT SERPL-MCNC: 5.9 G/DL (ref 6.4–8.2)
RBC # BLD AUTO: 4.06 M/UL (ref 4.2–5.4)
SODIUM SERPL-SCNC: 134 MMOL/L (ref 136–145)
TROPONIN I SERPL HS-MCNC: <6 NG/L (ref 0–14)
WBC OTHER # BLD: 12.1 K/UL (ref 4–10.5)

## 2025-04-15 PROCEDURE — 99285 EMERGENCY DEPT VISIT HI MDM: CPT

## 2025-04-15 PROCEDURE — 85025 COMPLETE CBC W/AUTO DIFF WBC: CPT

## 2025-04-15 PROCEDURE — 6360000002 HC RX W HCPCS: Performed by: STUDENT IN AN ORGANIZED HEALTH CARE EDUCATION/TRAINING PROGRAM

## 2025-04-15 PROCEDURE — 83690 ASSAY OF LIPASE: CPT

## 2025-04-15 PROCEDURE — 80053 COMPREHEN METABOLIC PANEL: CPT

## 2025-04-15 PROCEDURE — 94640 AIRWAY INHALATION TREATMENT: CPT

## 2025-04-15 PROCEDURE — 93005 ELECTROCARDIOGRAM TRACING: CPT | Performed by: STUDENT IN AN ORGANIZED HEALTH CARE EDUCATION/TRAINING PROGRAM

## 2025-04-15 PROCEDURE — 93010 ELECTROCARDIOGRAM REPORT: CPT | Performed by: INTERNAL MEDICINE

## 2025-04-15 PROCEDURE — 71045 X-RAY EXAM CHEST 1 VIEW: CPT

## 2025-04-15 PROCEDURE — 84484 ASSAY OF TROPONIN QUANT: CPT

## 2025-04-15 PROCEDURE — 83735 ASSAY OF MAGNESIUM: CPT

## 2025-04-15 RX ORDER — ALBUTEROL SULFATE 90 UG/1
2 INHALANT RESPIRATORY (INHALATION) 4 TIMES DAILY PRN
Qty: 54 G | Refills: 1 | Status: SHIPPED | OUTPATIENT
Start: 2025-04-15

## 2025-04-15 RX ORDER — ALBUTEROL SULFATE 0.83 MG/ML
2.5 SOLUTION RESPIRATORY (INHALATION) ONCE
Status: COMPLETED | OUTPATIENT
Start: 2025-04-15 | End: 2025-04-15

## 2025-04-15 RX ORDER — ALBUTEROL SULFATE 90 UG/1
2 INHALANT RESPIRATORY (INHALATION) 4 TIMES DAILY PRN
Qty: 54 G | Refills: 1 | Status: SHIPPED | OUTPATIENT
Start: 2025-04-15 | End: 2025-04-15

## 2025-04-15 RX ADMIN — ALBUTEROL SULFATE 2.5 MG: 2.5 SOLUTION RESPIRATORY (INHALATION) at 16:54

## 2025-04-15 ASSESSMENT — PAIN DESCRIPTION - LOCATION
LOCATION: CHEST
LOCATION: CHEST

## 2025-04-15 ASSESSMENT — PAIN SCALES - GENERAL
PAINLEVEL_OUTOF10: 4
PAINLEVEL_OUTOF10: 4

## 2025-04-15 ASSESSMENT — PAIN - FUNCTIONAL ASSESSMENT: PAIN_FUNCTIONAL_ASSESSMENT: 0-10

## 2025-04-15 NOTE — DISCHARGE INSTRUCTIONS
You can use acetaminophen as needed for pain  For the next 48 hours, every 4-6 hours while awake perform a breathing treatment or use your inhaler.  After that time frame, use as needed.  Call and follow-up with your family doctor in the next 1-3 days  Return to the ED if your symptoms worsen or you feel you need to be reevaluated

## 2025-04-15 NOTE — ED PROVIDER NOTES
Emergency Department Encounter        Pt Name: Santa Orlando  MRN: [id number]  Birthdate 2000  Date of evaluation: 4/15/2025  ED Physician: Donis Callahan MD    CHIEF COMPLAINT     Triage Chief Complaint:   Chest Pain      HISTORY OF PRESENT ILLNESS & REVIEW OF SYSTEMS     History obtained from the patient and staff and significant other at bedside.    Santa Orlando is a 25 y.o. female who presents to the emergency department for evaluation of chest pain.  Says that she began having some chest pain this morning, says it is like a pressure.  Over 3 hours prior to arrival in the ED.  Denies worsening with exertion.  Denies shortness of breath.  Denies any previous MI CVA, says she did have a blood clot and is on Lovenox and reports compliance.  Says she is about 18 weeks pregnant.  Denies any nausea vomiting abdominal pain diarrhea constipation.  Denies smoking.  Denies any other issues or concerns.        Patient denies any new Headache, Fever, Chills, Cough, Shortness of breath, Abdominal pain, Nausea, Vomiting, Diarrhea, Constipation, and Leg swelling.    The patient has no other acute complaints at this time.  Review of systems as above.          PAST MED/SURG/SOCIAL/FAM HISTORY & ALLERGY & MEDICATIONS     Past Medical History:   Diagnosis Date    Anxiety     Arthritis     Depression     DVT (deep vein thrombosis) in pregnancy     LLE    DVT complicating pregnancy, third trimester     LLE    FH: juvenile rheumatoid arthritis     IBS (irritable bowel syndrome)     Migraine     Obesity     Urinary frequency     Venogram 06/13/2023    chronic left common iliac vein DVT,flow is patent, continue anticoagulation     Patient Active Problem List   Diagnosis Code    Labor and delivery indication for care or intervention O75.9    Leg swelling in pregnancy, third trimester O12.03    DVT (deep vein thrombosis) in pregnancy O22.30    Supervision of high risk pregnancy in third trimester O09.93    33 weeks gestation of

## 2025-04-28 NOTE — PROGRESS NOTES
Patient Name:  Santa Orlando  Patient :  2000  Patient MRN:  [id number]     Primary Oncologist: Corey Hernandez MD  Referring Provider: Harper Steiner CNNP (Inactive)     Date of Service: 2025       Chief Complaint:    Chief Complaint   Patient presents with    Follow-up     Patient Active Problem List:     Labor and delivery indication for care or intervention     Leg swelling in pregnancy, third trimester     DVT (deep vein thrombosis) in pregnancy     Supervision of high risk pregnancy in third trimester     33 weeks gestation of pregnancy     Obesity affecting pregnancy in third trimester     Encounter for triage in pregnant patient     Encounter for induction of labor    HPI:   Santa Orlando is a 23 year-old female who was diagnosed with acute DVT of left common femoral, deep femoral and femoral veins as well as the greater saphenous vein junction on 23. She was in her third trimester of pregnancy at that time.      She has been on lovenox 1 mg/kg BID since then. She delivered her baby on 3/1/23.      She is not planning to breast feed baby and she prefers to switch her AC therapy to oral form.      She doesn't have previous history of VTE. Denies family history of VTE.      She still has mild swelling in left lower extremity. We changed her AC therapy to eliquis starting from 3/3/23.     She was released from hospital with an appointment to see me as an out patient.     She is tolerating eliquis well and she doesn't encounter any major side effects from it.     Since she still has significant left lower extremity swelling, I requested repeat doppler study to r/o progression of her DVT.     It was done on 4/3/23 and it showed nonocclusive thrombus throughout the deep veins of the left lower extremity. This is favored to represent a decrease in thrombus burden from the prior exam, although evaluation of the calf veins is limited.     Left LE doppler done on 23 showed no evidence of DVT in the

## 2025-04-29 ENCOUNTER — CLINICAL DOCUMENTATION (OUTPATIENT)
Dept: ONCOLOGY | Age: 25
End: 2025-04-29

## 2025-04-29 ENCOUNTER — HOSPITAL ENCOUNTER (OUTPATIENT)
Dept: INFUSION THERAPY | Age: 25
Discharge: HOME | End: 2025-04-29
Payer: COMMERCIAL

## 2025-04-29 ENCOUNTER — OFFICE VISIT (OUTPATIENT)
Dept: ONCOLOGY | Age: 25
End: 2025-04-29
Payer: COMMERCIAL

## 2025-04-29 VITALS
WEIGHT: 246 LBS | SYSTOLIC BLOOD PRESSURE: 117 MMHG | HEART RATE: 81 BPM | DIASTOLIC BLOOD PRESSURE: 62 MMHG | BODY MASS INDEX: 40.98 KG/M2 | TEMPERATURE: 97.8 F | HEIGHT: 65 IN | OXYGEN SATURATION: 100 %

## 2025-04-29 DIAGNOSIS — I82.412 ACUTE DEEP VEIN THROMBOSIS (DVT) OF FEMORAL VEIN OF LEFT LOWER EXTREMITY (HCC): Primary | ICD-10-CM

## 2025-04-29 PROCEDURE — G8427 DOCREV CUR MEDS BY ELIG CLIN: HCPCS | Performed by: INTERNAL MEDICINE

## 2025-04-29 PROCEDURE — G8419 CALC BMI OUT NRM PARAM NOF/U: HCPCS | Performed by: INTERNAL MEDICINE

## 2025-04-29 PROCEDURE — 99213 OFFICE O/P EST LOW 20 MIN: CPT | Performed by: INTERNAL MEDICINE

## 2025-04-29 PROCEDURE — 99212 OFFICE O/P EST SF 10 MIN: CPT

## 2025-04-29 PROCEDURE — 1036F TOBACCO NON-USER: CPT | Performed by: INTERNAL MEDICINE

## 2025-04-29 RX ORDER — ENOXAPARIN SODIUM 150 MG/ML
150 INJECTION SUBCUTANEOUS DAILY
Qty: 30 ML | Refills: 3 | Status: SHIPPED | OUTPATIENT
Start: 2025-04-29 | End: 2025-05-29

## 2025-04-29 ASSESSMENT — PATIENT HEALTH QUESTIONNAIRE - PHQ9
1. LITTLE INTEREST OR PLEASURE IN DOING THINGS: NOT AT ALL
SUM OF ALL RESPONSES TO PHQ QUESTIONS 1-9: 0
2. FEELING DOWN, DEPRESSED OR HOPELESS: NOT AT ALL
SUM OF ALL RESPONSES TO PHQ QUESTIONS 1-9: 0

## 2025-04-29 NOTE — PROGRESS NOTES
Patient in clinic for OV today 04/29/2025. Physician recommending to change lovenox to prophylactic dose of 150 mg/mL daily instead of 100 mg/mL BID since patient is not tolerating twice daily injections. This RN called [facility] @ [phone] to notify Dr. Kraus and Dr. Vergara per physician request.  voices understanding and will relay message. This RN direct number provided should office have any questions.

## 2025-04-29 NOTE — PROGRESS NOTES
MA Rooming Questions  Patient: Santa Orlando  MRN: [id number]    Date: 4/29/2025        1. Do you have any new issues?   no         2. Do you need any refills on medications?    no    3. Have you had any imaging done since your last visit?   yes - xray    4. Have you been hospitalized or seen in the emergency room since your last visit here?   yes - [facility] ER    5. Did the patient have a depression screening completed today? No    No data recorded     PHQ-9 Given to (if applicable):               PHQ-9 Score (if applicable):                     [] Positive     []  Negative              Does question #9 need addressed (if applicable)                     [] Yes    []  No               Lauryn Lynn CMA

## 2025-05-08 ENCOUNTER — HOSPITAL ENCOUNTER (OUTPATIENT)
Dept: ULTRASOUND IMAGING | Age: 25
Discharge: HOME | End: 2025-05-08
Payer: COMMERCIAL

## 2025-05-08 VITALS
WEIGHT: 242 LBS | TEMPERATURE: 97.9 F | HEART RATE: 90 BPM | DIASTOLIC BLOOD PRESSURE: 82 MMHG | OXYGEN SATURATION: 98 % | BODY MASS INDEX: 40.32 KG/M2 | SYSTOLIC BLOOD PRESSURE: 123 MMHG | HEIGHT: 65 IN | RESPIRATION RATE: 18 BRPM

## 2025-05-08 DIAGNOSIS — Z36.89 ENCOUNTER FOR FETAL ANATOMIC SURVEY: ICD-10-CM

## 2025-05-08 PROCEDURE — 76811 OB US DETAILED SNGL FETUS: CPT

## 2025-05-08 NOTE — CONSULTS
[facility] Maternal Fetal Medicine  Ultrasound and Co-Management Report    Patient name: Santa Orlando Age: 25 y.o. : 2000     Requesting provider: Dr. Santiago    Date of Service: 9/15/25    SUE: Estimated Date of Delivery: 9/15/25 Gestational Age: 21w3d OB Hx:     Indication: Hx of DVT, hx of FGR, maternal thyroid disease, maternal BMI 40    Scan type: Detailed anatomy     Fetus #: 1    Presentation: breech  Fetal Heart Rate: 136    Placental location: posterior  Amniotic fluid: average    P. Cord Insertion: normal  Single Deepest Pocket: 4.32cm    Biometry:  Biparietal Diameter    5.13cm  53.3%  Head Circumference   19.28cm  43.6%  Abdominal Circumference  17.63cm  77.5%  Femur Length    3.30cm  10.3%  Humerus Length   3.38cm  49.1%  Cerebellum    2.37cm  90.0%  Cisterna Magna   4.39mm  Nuchal Fold Thickness  5.32mm  Lateral Ventricle   6.18mm  Ocular Diameter (OOD)          3.27cm  16.3%  Ulna     3.09cm  36.5%  Radius     2.82cm  41.5%  Tibia     3.05cm  44.2%  Fibula      3.13cm  44.5%  Foot     3.73cm  56.3%    Fetal weight:    432g  49.4%    Anatomy:      Head  Cranium:  Visualized   Intracranial Kathy: Visualized  Fetal Cavum:  Visualized  Lateral Ventricles:  Visualized  Choroid Plexus:  Visualized  Cerebellum:   Visualized  Midline Falx:   Visualized  PMT/AR:   Visualized  Midface:   Visualized  Profile:   Visualized  Orbits/Eyes:  Visualized      Thorax  4 Chamber Heart: Visualized  RVOT:   Visualized  LVOT:     Visualized  3VV:    Visualized  3VTV:    Visualized  Aortic Arch:    Visualized  Ductal Arch:    Visualized  SVC/IVC:    Visualized  Crossing:  Visualized  Cardiac Rhythm: Normal  Cardiac Axis:   Normal  Cardiac Situs:   Normal        Spine  Cervical  Visualized  Thoracic  Visualized  Lumbar  Visualized  Sacral   Visualized        Abdomen  Diaphragm  Visualized  Stomach   Visualized  Bowel   Visualized  Bladder   Visualized  Abd. Cord Insertion  Visualized  Ventral

## 2025-05-08 NOTE — PROGRESS NOTES
Patient arrived ambulatory to [facility] clinic. Patient has no pregnancy complaints. VS taken. History, medications and allergies reviewed. Ultrasound technician notified of arrival.

## 2025-05-10 ENCOUNTER — HOSPITAL ENCOUNTER (OUTPATIENT)
Age: 25
Discharge: HOME | End: 2025-05-10
Attending: OBSTETRICS & GYNECOLOGY | Admitting: OBSTETRICS & GYNECOLOGY
Payer: COMMERCIAL

## 2025-05-10 VITALS
TEMPERATURE: 98.3 F | HEART RATE: 81 BPM | SYSTOLIC BLOOD PRESSURE: 119 MMHG | OXYGEN SATURATION: 98 % | RESPIRATION RATE: 18 BRPM | DIASTOLIC BLOOD PRESSURE: 64 MMHG

## 2025-05-10 PROBLEM — N93.9 VAGINAL SPOTTING: Status: ACTIVE | Noted: 2025-05-10

## 2025-05-10 PROCEDURE — 99212 OFFICE O/P EST SF 10 MIN: CPT

## 2025-05-10 PROCEDURE — 99213 OFFICE O/P EST LOW 20 MIN: CPT | Performed by: ADVANCED PRACTICE MIDWIFE

## 2025-05-10 NOTE — PROGRESS NOTES
Department of Obstetrics and Gynecology  Labor and Delivery  TRIAGE NOTE      SUBJECTIVE:    Chief Complaint   Patient presents with    Vaginal Bleeding     Spotting       Santa is a 26 yo  at 21.5 weeks. She presents to triage with concern for light vaginal bleeding that she notices when wiping. She denies heavy vaginal bleeding. She also reports mild cramping that feels like she is about to start her period. She denies regular ctxs and urinary sx. She does endorse a hx of a left leg DVT in her previous pregnancy and is followed by hematology and on therapeutic lovenox dosing.     OBJECTIVE    Vitals:  /60   Pulse 93   Temp 98.3 °F (36.8 °C) (Oral)   Resp 18   LMP 2024   SpO2 97%       CONSTITUTIONAL:  negative  RESPIRATORY:  negative  CARDIOVASCULAR:  negative  GASTROINTESTINAL:  negative  ALLERGIC/IMMUNOLOGIC:  negative  NEUROLOGICAL:  negative  BEHAVIOR/PSYCH:  negative    SSE: cervix visually closed, no lof/vb noted in vaginal vault, no lof/vb with valsalva. Ectroption and nabothian cysts noted on the face of cervix. No active bleeding or friability.     Fetal Position:    Uncertain d/t GA     Membranes:    Intact    Fetal heart rate: 145 by doppler      DATA:  UA pending- urine dark, encouraged increased hydration    Blood Type- A+    ASSESSMENT:       26 yo  at 21.5 weeks  FHR + by doppler  Vaginal spotting   Rh +  Hx of DVT on therapeutic lovenox dosing     PLAN:    Advised pt that light bleeding is likely r/t ectropion and lovenox administration. Advised pt that her cervix is closed and this is not concerning just a side effect of medication required to prevent DVT. Encouraged continued use of lovenox.     Cramping is likely r/t growing uterus. She denies urinary sx or hx of Uti this pregnancy but will send UA to r/o and f/u if tx is required. Urine is dark and recommended increased hydration.    Will discharge home with outpatient f/u.       KYRA Fleming - CORNELIUS

## 2025-05-10 NOTE — FLOWSHEET NOTE
Patient ambulatory to the labor and delivery department to be evaluated for complaints of spotting since yesterday and cramping. Patient reports the spotting as being light but consistent today with a brownish red color. Patient denies any urinary symptoms. EFM evaluated by doppler at 145bpm.

## 2025-05-10 NOTE — FLOWSHEET NOTE
Discharge instructions and follow up care reviewed with the patient. Questions addressed. Patient ambulatory to the exit.

## 2025-06-25 LAB
T4 FREE: NORMAL
TSH SERPL DL<=0.05 MIU/L-ACNC: NORMAL M[IU]/L
TSH SERPL DL<=0.05 MIU/L-ACNC: NORMAL M[IU]/L

## 2025-06-30 RX ORDER — ENOXAPARIN SODIUM 150 MG/ML
INJECTION SUBCUTANEOUS
COMMUNITY

## 2025-07-03 ENCOUNTER — HOSPITAL ENCOUNTER (OUTPATIENT)
Dept: ULTRASOUND IMAGING | Age: 25
Discharge: HOME | End: 2025-07-03
Payer: COMMERCIAL

## 2025-07-03 VITALS
SYSTOLIC BLOOD PRESSURE: 116 MMHG | RESPIRATION RATE: 18 BRPM | TEMPERATURE: 97.7 F | HEART RATE: 89 BPM | OXYGEN SATURATION: 97 % | DIASTOLIC BLOOD PRESSURE: 66 MMHG

## 2025-07-03 DIAGNOSIS — Z3A.29 29 WEEKS GESTATION OF PREGNANCY: ICD-10-CM

## 2025-07-03 PROCEDURE — 76816 OB US FOLLOW-UP PER FETUS: CPT

## 2025-07-03 PROCEDURE — 99215 OFFICE O/P EST HI 40 MIN: CPT | Performed by: OBSTETRICS & GYNECOLOGY

## 2025-07-03 NOTE — FLOWSHEET NOTE
Patient arrived independently to [facility] appt via ambulation. Pt was escorted to [facility] for scheduled US. Vital signs were obtained and hx was reviewed upon arrival. Patient is currently awaiting for completion of Us. All pt questions were addressed, pt voiced no concerns at this time.     This note narrated and vitals by KOKO Burleson RN

## 2025-07-03 NOTE — FLOWSHEET NOTE
Pt escorted off unit, pt ambulated independently. All questions and concerns addressed.  Pt to follow up in 6 weeks.     This note narrated by KOKO Burleson RN

## 2025-07-03 NOTE — PROGRESS NOTES
Maternal-Fetal Medicine Follow-Up Note    Date of service: 7/3/2025     Subjective:  25 y.o.  at 29w3d with an Estimated Date of Delivery: 9/15/25 who presents for follow-up growth ultrasound.  The patient is doing well today and is otherwise without complaints.  Denies contractions, vaginal bleeding, loss of fluid, or decreased fetal movement.    Problem List:  Patient Active Problem List   Diagnosis    Labor and delivery indication for care or intervention    Leg swelling in pregnancy, third trimester    DVT (deep vein thrombosis) in pregnancy    Supervision of high risk pregnancy in third trimester    33 weeks gestation of pregnancy    Obesity affecting pregnancy in third trimester    Encounter for triage in pregnant patient    Encounter for induction of labor    Acute deep vein thrombosis (DVT) of left lower extremity (HCC)    Vaginal spotting     Objective:  See nursing note for vitals taken today (normal)    Ultrasound findings:  Growth Ultrasound    1. Mcmahon intrauterine pregnancy in the Cephalic presentation with a gestational age of 29w3d based on the menstrual dates.The SUE is 09/15/25.  2. Estimated fetal weight 1392g corresponding to 37%.  3. Amniotic fluid: Average, with a single deepest pocket of  6.90cm.  4. The placenta is Posterior .  5. A full anatomic study was previously performed.    Imaging reviewed. Met with patient. 25 y.o.  at 29w3d who presents for follow-up growth ultrasound.  Appropriate fetal growth for gestational age noted today.  Recommend repeat growth in 6 weeks for BMI > 40 and weekly NST starting at 34 weeks until delivery at 39 weeks for BMI > 40 and on anticoagulation unless other obstetrical or medical indications arise. Patient verbalized understanding and all questions answered.    Assessment and Plan:   25 y.o.  at 29w3d who presents for follow-up growth for BMI > 40, maternal hypothyroidism, history of prior pregnancy affected by fetal growth

## 2025-07-08 ENCOUNTER — ROUTINE PRENATAL (OUTPATIENT)
Age: 25
End: 2025-07-08

## 2025-07-08 VITALS — WEIGHT: 257 LBS | SYSTOLIC BLOOD PRESSURE: 122 MMHG | BODY MASS INDEX: 42.77 KG/M2 | DIASTOLIC BLOOD PRESSURE: 72 MMHG

## 2025-07-08 DIAGNOSIS — Z86.718 HISTORY OF DEEP VEIN THROMBOSIS (DVT) DURING PREGNANCY: ICD-10-CM

## 2025-07-08 DIAGNOSIS — Z3A.30 30 WEEKS GESTATION OF PREGNANCY: Primary | ICD-10-CM

## 2025-07-08 DIAGNOSIS — O09.93 SUPERVISION OF HIGH RISK PREGNANCY IN THIRD TRIMESTER: ICD-10-CM

## 2025-07-08 DIAGNOSIS — Z87.59 HISTORY OF DEEP VEIN THROMBOSIS (DVT) DURING PREGNANCY: ICD-10-CM

## 2025-07-08 SDOH — ECONOMIC STABILITY: FOOD INSECURITY: WITHIN THE PAST 12 MONTHS, THE FOOD YOU BOUGHT JUST DIDN'T LAST AND YOU DIDN'T HAVE MONEY TO GET MORE.: NEVER TRUE

## 2025-07-08 SDOH — ECONOMIC STABILITY: FOOD INSECURITY: WITHIN THE PAST 12 MONTHS, YOU WORRIED THAT YOUR FOOD WOULD RUN OUT BEFORE YOU GOT MONEY TO BUY MORE.: NEVER TRUE

## 2025-07-08 NOTE — PROGRESS NOTES
Immunizations Administered       Name Date Dose Route    TDaP, ADACEL (age 10y-64y), BOOSTRIX (age 10y+), IM, 0.5mL 7/8/2025 0.5 mL Intramuscular    Site: Deltoid- Left    Lot: [id number]    NDC: 00717-434-06           
yrs+), IM  2. Supervision of high risk pregnancy in third trimester  -     Tdap, BOOSTRIX, (age 10 yrs+), IM  3. History of deep vein thrombosis (DVT) during pregnancy   She is currently on Lovenox 150 mg SQ daily and will be switched to regular heparin Q12 hours at 36 weeks for improved peripartum management so she can more likely get regional anesthesia.    Return in about 2 weeks (around 7/22/2025) for ZULEMA visit and TDAP.    All OB labs and imaging reviewed  Vitamins for the duration of pregnancy  Okay for episodic Tylenol usage during pregnancy.  I do not recommend routine chronic Tylenol use in pregnancy however.    Uriah Walker MD    Please note, documentation for this visit was generated using dragon dictation software.  Although every effort was made to ensure accuracy; inadvertent, unintentional transcription errors may have occurred.

## 2025-07-23 ENCOUNTER — ROUTINE PRENATAL (OUTPATIENT)
Age: 25
End: 2025-07-23
Payer: COMMERCIAL

## 2025-07-23 VITALS
DIASTOLIC BLOOD PRESSURE: 72 MMHG | BODY MASS INDEX: 42.75 KG/M2 | HEART RATE: 83 BPM | HEIGHT: 65 IN | SYSTOLIC BLOOD PRESSURE: 120 MMHG | WEIGHT: 256.6 LBS

## 2025-07-23 DIAGNOSIS — O99.283 HYPOTHYROIDISM DURING PREGNANCY IN THIRD TRIMESTER: ICD-10-CM

## 2025-07-23 DIAGNOSIS — O99.210: Primary | ICD-10-CM

## 2025-07-23 DIAGNOSIS — R10.2 PELVIC PAIN: ICD-10-CM

## 2025-07-23 DIAGNOSIS — O09.93 ENCOUNTER FOR SUPERVISION OF HIGH RISK PREGNANCY IN THIRD TRIMESTER, ANTEPARTUM: ICD-10-CM

## 2025-07-23 DIAGNOSIS — E03.9 HYPOTHYROIDISM DURING PREGNANCY IN THIRD TRIMESTER: ICD-10-CM

## 2025-07-23 PROCEDURE — G8419 CALC BMI OUT NRM PARAM NOF/U: HCPCS

## 2025-07-23 PROCEDURE — 99214 OFFICE O/P EST MOD 30 MIN: CPT

## 2025-07-23 PROCEDURE — 1036F TOBACCO NON-USER: CPT

## 2025-07-23 PROCEDURE — G8427 DOCREV CUR MEDS BY ELIG CLIN: HCPCS

## 2025-07-23 NOTE — PROGRESS NOTES
Return OB Office Visit    CC:   Chief Complaint   Patient presents with    Routine Prenatal Visit     Pt is 32/2 here for ZULEMA. Denies any LOF, VB or contractions. Reports +FM.        HPI:  Pt seen and examined. C/o bilateral hip pain. Asking what she can do about it. Denies VB, LOF, ctx. +FM    Review of Systems: The following ROS was otherwise negative, except as noted in the HPI: constitutional, HEENT, respiratory, cardiovascular, gastrointestinal, genitourinary, skin, musculoskeletal, neurological, psych    Objective:  /72   Pulse 83   Ht 1.651 m (5' 5\")   Wt 116.4 kg (256 lb 9.6 oz)   LMP 2024   BMI 42.70 kg/m²   Gen: AO, NAD  Abd: Soft, NT  FHT: 140 bpm      Assessment/Plan:  25 y.o.  at 32w2d (Estimated Date of Delivery: 9/15/25) presents for ZULEMA appointment:       1. Maternal obesity syndrome, antepartum, unspecified trimester  2. Hypothyroidism during pregnancy in third trimester  3. Encounter for supervision of high risk pregnancy in third trimester, antepartum  4. Pelvic pain  Comments:  Discussed pregnancy support belt and pelvic floor PT. Referral sent.     Per [facility] recommendation, repeat growth US, scheduled on . Weekly NST starting at 34 weeks.   Continue assess TSH q trimester (next due in 2025).   Per Dr. Walker, plan on induce her around 39wks. currently on Lovenox 150 mg SQ daily and will be switched to regular heparin Q12 hours at 36 weeks for improved peripartum management so she can more likely get regional anesthesia. She is following up with hematology next week.     Dispo: Return in about 2 weeks (around 2025) for NST.   Yony Moreira, APRN - CNP

## 2025-07-27 NOTE — PROGRESS NOTES
Patient Name:  Santa Orlando  Patient :  2000  Patient MRN:  [id number]     Primary Oncologist: Corey Hernandez MD  Referring Provider: Harper Steiner CNNP (Inactive)     Date of Service: 2025       Chief Complaint:    Chief Complaint   Patient presents with    Follow-up     Patient Active Problem List:     Labor and delivery indication for care or intervention     Leg swelling in pregnancy, third trimester     DVT (deep vein thrombosis) in pregnancy     Supervision of high risk pregnancy in third trimester     33 weeks gestation of pregnancy     Obesity affecting pregnancy in third trimester     Encounter for triage in pregnant patient     Encounter for induction of labor    HPI:   Santa Orlando is a 25 year-old female who was diagnosed with acute DVT of left common femoral, deep femoral and femoral veins as well as the greater saphenous vein junction on 23. She was in her third trimester of pregnancy at that time.      She has been on lovenox 1 mg/kg BID since then. She delivered her baby on 3/1/23.      She is not planning to breast feed baby and she prefers to switch her AC therapy to oral form.      She doesn't have previous history of VTE. Denies family history of VTE.      She still has mild swelling in left lower extremity. We changed her AC therapy to eliquis starting from 3/3/23.     She was released from hospital with an appointment to see me as an out patient.     She is tolerating eliquis well and she doesn't encounter any major side effects from it.     Since she still has significant left lower extremity swelling, I requested repeat doppler study to r/o progression of her DVT.     It was done on 4/3/23 and it showed nonocclusive thrombus throughout the deep veins of the left lower extremity. This is favored to represent a decrease in thrombus burden from the prior exam, although evaluation of the calf veins is limited.     Left LE doppler done on 23 showed no evidence of DVT in the

## 2025-07-28 ENCOUNTER — OFFICE VISIT (OUTPATIENT)
Dept: ONCOLOGY | Age: 25
End: 2025-07-28
Payer: COMMERCIAL

## 2025-07-28 ENCOUNTER — CLINICAL DOCUMENTATION (OUTPATIENT)
Dept: ONCOLOGY | Age: 25
End: 2025-07-28

## 2025-07-28 ENCOUNTER — HOSPITAL ENCOUNTER (OUTPATIENT)
Dept: INFUSION THERAPY | Age: 25
Discharge: HOME | End: 2025-07-28
Payer: COMMERCIAL

## 2025-07-28 VITALS
OXYGEN SATURATION: 100 % | HEART RATE: 92 BPM | BODY MASS INDEX: 42.75 KG/M2 | HEIGHT: 65 IN | TEMPERATURE: 98.2 F | DIASTOLIC BLOOD PRESSURE: 57 MMHG | WEIGHT: 256.6 LBS | SYSTOLIC BLOOD PRESSURE: 104 MMHG

## 2025-07-28 DIAGNOSIS — R10.2 PELVIC PAIN: Primary | ICD-10-CM

## 2025-07-28 DIAGNOSIS — I82.412 ACUTE DEEP VEIN THROMBOSIS (DVT) OF FEMORAL VEIN OF LEFT LOWER EXTREMITY (HCC): ICD-10-CM

## 2025-07-28 DIAGNOSIS — I82.412 ACUTE DEEP VEIN THROMBOSIS (DVT) OF FEMORAL VEIN OF LEFT LOWER EXTREMITY (HCC): Primary | ICD-10-CM

## 2025-07-28 LAB
ALBUMIN SERPL-MCNC: 3.2 G/DL (ref 3.4–5)
ALBUMIN/GLOB SERPL: 1 {RATIO} (ref 1.1–2.2)
ALP SERPL-CCNC: 130 U/L (ref 40–129)
ALT SERPL-CCNC: 26 U/L (ref 10–40)
ANION GAP SERPL CALCULATED.3IONS-SCNC: 11 MMOL/L (ref 9–17)
AST SERPL-CCNC: 21 U/L (ref 15–37)
BASOPHILS # BLD: 0.03 K/UL
BASOPHILS NFR BLD: 0 % (ref 0–1)
BILIRUB SERPL-MCNC: 0.3 MG/DL (ref 0–1)
BUN SERPL-MCNC: 8 MG/DL (ref 7–20)
CALCIUM SERPL-MCNC: 8.8 MG/DL (ref 8.3–10.6)
CHLORIDE SERPL-SCNC: 105 MMOL/L (ref 99–110)
CO2 SERPL-SCNC: 20 MMOL/L (ref 21–32)
CREAT SERPL-MCNC: 0.6 MG/DL (ref 0.6–1.1)
EOSINOPHIL # BLD: 0.31 K/UL
EOSINOPHILS RELATIVE PERCENT: 3 % (ref 0–3)
ERYTHROCYTE [DISTWIDTH] IN BLOOD BY AUTOMATED COUNT: 15.4 % (ref 11.7–14.9)
GFR, ESTIMATED: >90 ML/MIN/1.73M2
GLUCOSE SERPL-MCNC: 90 MG/DL (ref 74–99)
HCT VFR BLD AUTO: 33.1 % (ref 37–47)
HGB BLD-MCNC: 10.4 G/DL (ref 12.5–16)
LYMPHOCYTES NFR BLD: 1.45 K/UL
LYMPHOCYTES RELATIVE PERCENT: 12 % (ref 24–44)
MCH RBC QN AUTO: 27.6 PG (ref 27–31)
MCHC RBC AUTO-ENTMCNC: 31.4 G/DL (ref 32–36)
MCV RBC AUTO: 87.8 FL (ref 78–100)
MONOCYTES NFR BLD: 0.86 K/UL
MONOCYTES NFR BLD: 7 % (ref 0–5)
NEUTROPHILS NFR BLD: 79 % (ref 36–66)
NEUTS SEG NFR BLD: 9.74 K/UL
PLATELET # BLD AUTO: 230 K/UL (ref 140–440)
PMV BLD AUTO: 10.7 FL (ref 7.5–11.1)
POTASSIUM SERPL-SCNC: 4.1 MMOL/L (ref 3.5–5.1)
PROT SERPL-MCNC: 6.3 G/DL (ref 6.4–8.2)
RBC # BLD AUTO: 3.77 M/UL (ref 4.2–5.4)
SODIUM SERPL-SCNC: 136 MMOL/L (ref 136–145)
WBC OTHER # BLD: 12.4 K/UL (ref 4–10.5)

## 2025-07-28 PROCEDURE — 80053 COMPREHEN METABOLIC PANEL: CPT

## 2025-07-28 PROCEDURE — 85025 COMPLETE CBC W/AUTO DIFF WBC: CPT

## 2025-07-28 PROCEDURE — 99212 OFFICE O/P EST SF 10 MIN: CPT

## 2025-07-28 PROCEDURE — 99214 OFFICE O/P EST MOD 30 MIN: CPT | Performed by: INTERNAL MEDICINE

## 2025-07-28 PROCEDURE — 36415 COLL VENOUS BLD VENIPUNCTURE: CPT

## 2025-07-28 PROCEDURE — 1036F TOBACCO NON-USER: CPT | Performed by: INTERNAL MEDICINE

## 2025-07-28 PROCEDURE — G8419 CALC BMI OUT NRM PARAM NOF/U: HCPCS | Performed by: INTERNAL MEDICINE

## 2025-07-28 PROCEDURE — G8427 DOCREV CUR MEDS BY ELIG CLIN: HCPCS | Performed by: INTERNAL MEDICINE

## 2025-07-28 RX ORDER — HEPARIN SODIUM 5000 [USP'U]/ML
5000 INJECTION, SOLUTION INTRAVENOUS; SUBCUTANEOUS 2 TIMES DAILY
Qty: 14 ML | Refills: 0 | Status: SHIPPED | OUTPATIENT
Start: 2025-07-28 | End: 2025-08-04

## 2025-07-28 NOTE — PROGRESS NOTES
Patient is 33 weeks pregnant. Patient to be induced at 39 weeks. Patient to continue with lovenox injections up to 1 week before induction. Then, patient has been instructed to stop lovenox and administer heparin 5,000 units/1 mL BID for 7 days. Physician discussed plan with patient and agreeable. RX for heparin e-scribed to [facility] pharmacy in [place].

## 2025-07-28 NOTE — PROGRESS NOTES
MA/LPN Rooming Questions  Patient: Santa Orlando  MRN: [id number]    Date: 7/28/2025        1. Do you have any new issues?   yes - Patient states wants to discuss medications.          2. Do you need any refills on medications?    no    3. Have you had any imaging done since your last visit?   no    4. Have you been hospitalized or seen in the emergency room since your last visit here?   no    5. Did the patient have a depression screening completed today? No    No data recorded     PHQ-9 Given to (if applicable):               PHQ-9 Score (if applicable):                     [] Positive     []  Negative              Does question #9 need addressed (if applicable)                     [] Yes    []  No               Tamika Benites MA

## 2025-07-29 ENCOUNTER — CLINICAL DOCUMENTATION (OUTPATIENT)
Dept: ONCOLOGY | Age: 25
End: 2025-07-29

## 2025-07-29 NOTE — PROGRESS NOTES
Heparin Sodium (Porcine) PF 5000UNIT/ML solution   PA submitted thru cover my meds.   Approval thru cover my meds approved.   Approved on July 30 by [place] [employer] 2017  Your PA request for [id number] was approved for 7 days. The PA# assigned is [id number].  Effective Date: 7/29/2025  Authorization Expiration Date: 8/4/2025

## 2025-08-06 ENCOUNTER — ROUTINE PRENATAL (OUTPATIENT)
Age: 25
End: 2025-08-06

## 2025-08-06 VITALS
DIASTOLIC BLOOD PRESSURE: 80 MMHG | WEIGHT: 259 LBS | SYSTOLIC BLOOD PRESSURE: 115 MMHG | BODY MASS INDEX: 43.1 KG/M2 | HEART RATE: 97 BPM

## 2025-08-06 DIAGNOSIS — O99.283 HYPOTHYROIDISM DURING PREGNANCY IN THIRD TRIMESTER: Primary | ICD-10-CM

## 2025-08-06 DIAGNOSIS — E03.9 HYPOTHYROIDISM DURING PREGNANCY IN THIRD TRIMESTER: Primary | ICD-10-CM

## 2025-08-06 DIAGNOSIS — Z87.59 HISTORY OF DEEP VEIN THROMBOSIS (DVT) DURING PREGNANCY: ICD-10-CM

## 2025-08-06 DIAGNOSIS — Z86.718 HISTORY OF DEEP VEIN THROMBOSIS (DVT) DURING PREGNANCY: ICD-10-CM

## 2025-08-06 DIAGNOSIS — O09.93 ENCOUNTER FOR SUPERVISION OF HIGH RISK PREGNANCY IN THIRD TRIMESTER, ANTEPARTUM: ICD-10-CM

## 2025-08-06 DIAGNOSIS — O13.9 GESTATIONAL HYPERTENSION, ANTEPARTUM: ICD-10-CM

## 2025-08-13 ENCOUNTER — TELEPHONE (OUTPATIENT)
Dept: OBGYN | Age: 25
End: 2025-08-13

## 2025-08-13 ENCOUNTER — ROUTINE PRENATAL (OUTPATIENT)
Age: 25
End: 2025-08-13
Payer: COMMERCIAL

## 2025-08-13 VITALS
DIASTOLIC BLOOD PRESSURE: 75 MMHG | WEIGHT: 257.4 LBS | SYSTOLIC BLOOD PRESSURE: 124 MMHG | BODY MASS INDEX: 42.83 KG/M2 | HEART RATE: 80 BPM

## 2025-08-13 DIAGNOSIS — O99.283 HYPOTHYROIDISM AFFECTING PREGNANCY IN THIRD TRIMESTER: ICD-10-CM

## 2025-08-13 DIAGNOSIS — Z87.59 HISTORY OF MATERNAL DEEP VEIN THROMBOSIS (DVT): Primary | ICD-10-CM

## 2025-08-13 DIAGNOSIS — Z86.718 HISTORY OF MATERNAL DEEP VEIN THROMBOSIS (DVT): ICD-10-CM

## 2025-08-13 DIAGNOSIS — Z3A.35 35 WEEKS GESTATION OF PREGNANCY: ICD-10-CM

## 2025-08-13 DIAGNOSIS — E03.9 HYPOTHYROIDISM AFFECTING PREGNANCY IN THIRD TRIMESTER: ICD-10-CM

## 2025-08-13 DIAGNOSIS — Z87.59 HISTORY OF MATERNAL DEEP VEIN THROMBOSIS (DVT): ICD-10-CM

## 2025-08-13 DIAGNOSIS — Z86.718 HISTORY OF MATERNAL DEEP VEIN THROMBOSIS (DVT): Primary | ICD-10-CM

## 2025-08-13 DIAGNOSIS — O09.93 HIGH-RISK PREGNANCY IN THIRD TRIMESTER: ICD-10-CM

## 2025-08-13 PROBLEM — Z36.89 ENCOUNTER FOR TRIAGE IN PREGNANT PATIENT: Status: RESOLVED | Noted: 2023-02-16 | Resolved: 2025-08-13

## 2025-08-13 PROBLEM — Z3A.33 33 WEEKS GESTATION OF PREGNANCY: Status: RESOLVED | Noted: 2023-01-29 | Resolved: 2025-08-13

## 2025-08-13 PROBLEM — Z34.90 ENCOUNTER FOR INDUCTION OF LABOR: Status: RESOLVED | Noted: 2023-03-01 | Resolved: 2025-08-13

## 2025-08-13 PROCEDURE — 1036F TOBACCO NON-USER: CPT | Performed by: OBSTETRICS & GYNECOLOGY

## 2025-08-13 PROCEDURE — G8427 DOCREV CUR MEDS BY ELIG CLIN: HCPCS | Performed by: OBSTETRICS & GYNECOLOGY

## 2025-08-13 PROCEDURE — 36415 COLL VENOUS BLD VENIPUNCTURE: CPT | Performed by: OBSTETRICS & GYNECOLOGY

## 2025-08-13 PROCEDURE — 99213 OFFICE O/P EST LOW 20 MIN: CPT | Performed by: OBSTETRICS & GYNECOLOGY

## 2025-08-13 PROCEDURE — G8419 CALC BMI OUT NRM PARAM NOF/U: HCPCS | Performed by: OBSTETRICS & GYNECOLOGY

## 2025-08-13 PROCEDURE — 59025 FETAL NON-STRESS TEST: CPT | Performed by: OBSTETRICS & GYNECOLOGY

## 2025-08-13 RX ORDER — HEPARIN SODIUM 5000 [USP'U]/ML
5000 INJECTION, SOLUTION INTRAVENOUS; SUBCUTANEOUS 2 TIMES DAILY
Qty: 60 ML | Refills: 1 | Status: SHIPPED | OUTPATIENT
Start: 2025-08-13 | End: 2025-08-15 | Stop reason: SDUPTHER

## 2025-08-14 LAB — TSH SERPL DL<=0.005 MIU/L-ACNC: 3.68 UIU/ML (ref 0.27–4.2)

## 2025-08-15 RX ORDER — HEPARIN SODIUM 5000 [USP'U]/ML
5000 INJECTION, SOLUTION INTRAVENOUS; SUBCUTANEOUS 2 TIMES DAILY
Qty: 60 ML | Refills: 1 | Status: SHIPPED | OUTPATIENT
Start: 2025-08-15 | End: 2025-10-14

## 2025-08-20 ENCOUNTER — ROUTINE PRENATAL (OUTPATIENT)
Age: 25
End: 2025-08-20
Payer: COMMERCIAL

## 2025-08-20 VITALS
DIASTOLIC BLOOD PRESSURE: 72 MMHG | HEART RATE: 79 BPM | WEIGHT: 257 LBS | BODY MASS INDEX: 42.77 KG/M2 | SYSTOLIC BLOOD PRESSURE: 130 MMHG

## 2025-08-20 DIAGNOSIS — Z87.59 HISTORY OF MATERNAL DEEP VEIN THROMBOSIS (DVT): ICD-10-CM

## 2025-08-20 DIAGNOSIS — O99.210: ICD-10-CM

## 2025-08-20 DIAGNOSIS — E03.9 HYPOTHYROIDISM AFFECTING PREGNANCY IN THIRD TRIMESTER: ICD-10-CM

## 2025-08-20 DIAGNOSIS — Z3A.36 36 WEEKS GESTATION OF PREGNANCY: ICD-10-CM

## 2025-08-20 DIAGNOSIS — O09.93 SUPERVISION OF HIGH RISK PREGNANCY IN THIRD TRIMESTER: Primary | ICD-10-CM

## 2025-08-20 DIAGNOSIS — Z86.718 HISTORY OF MATERNAL DEEP VEIN THROMBOSIS (DVT): ICD-10-CM

## 2025-08-20 DIAGNOSIS — O99.283 HYPOTHYROIDISM AFFECTING PREGNANCY IN THIRD TRIMESTER: ICD-10-CM

## 2025-08-20 PROCEDURE — 99213 OFFICE O/P EST LOW 20 MIN: CPT | Performed by: OBSTETRICS & GYNECOLOGY

## 2025-08-20 PROCEDURE — 59025 FETAL NON-STRESS TEST: CPT | Performed by: OBSTETRICS & GYNECOLOGY

## 2025-08-21 ENCOUNTER — HOSPITAL ENCOUNTER (OUTPATIENT)
Dept: ULTRASOUND IMAGING | Age: 25
Discharge: HOME | End: 2025-08-21
Payer: COMMERCIAL

## 2025-08-21 VITALS
OXYGEN SATURATION: 100 % | BODY MASS INDEX: 42.77 KG/M2 | WEIGHT: 257 LBS | RESPIRATION RATE: 18 BRPM | HEART RATE: 82 BPM | SYSTOLIC BLOOD PRESSURE: 116 MMHG | DIASTOLIC BLOOD PRESSURE: 70 MMHG

## 2025-08-21 DIAGNOSIS — Z36.89 ENCOUNTER FOR ULTRASOUND TO ASSESS INTERVAL GROWTH OF FETUS: ICD-10-CM

## 2025-08-21 PROCEDURE — 76816 OB US FOLLOW-UP PER FETUS: CPT

## 2025-08-22 ENCOUNTER — HOSPITAL ENCOUNTER (OUTPATIENT)
Age: 25
Discharge: HOME | End: 2025-08-22
Attending: OBSTETRICS & GYNECOLOGY | Admitting: OBSTETRICS & GYNECOLOGY
Payer: COMMERCIAL

## 2025-08-22 VITALS
RESPIRATION RATE: 18 BRPM | SYSTOLIC BLOOD PRESSURE: 129 MMHG | TEMPERATURE: 98 F | OXYGEN SATURATION: 98 % | DIASTOLIC BLOOD PRESSURE: 75 MMHG | HEART RATE: 93 BPM

## 2025-08-22 PROBLEM — O47.9 BRAXTON HICK'S CONTRACTION: Status: ACTIVE | Noted: 2025-08-22

## 2025-08-22 PROBLEM — O47.9 UTERINE CONTRACTIONS: Status: ACTIVE | Noted: 2025-08-22

## 2025-08-22 LAB
BILIRUB UR QL STRIP: NEGATIVE
CLARITY UR: CLEAR
COLOR UR: YELLOW
EPI CELLS #/AREA URNS HPF: 14 /HPF
GLUCOSE UR STRIP-MCNC: NEGATIVE MG/DL
HGB UR QL STRIP.AUTO: NEGATIVE
KETONES UR STRIP-MCNC: NEGATIVE MG/DL
LEUKOCYTE ESTERASE UR QL STRIP: ABNORMAL
MUCOUS THREADS URNS QL MICRO: ABNORMAL
NITRITE UR QL STRIP: NEGATIVE
PH UR STRIP: 6 [PH] (ref 5–8)
PROT UR STRIP-MCNC: NEGATIVE MG/DL
RBC #/AREA URNS HPF: 2 /HPF (ref 0–2)
SP GR UR STRIP: 1.02 (ref 1–1.03)
UROBILINOGEN UR STRIP-ACNC: 0.2 EU/DL (ref 0–1)
WBC #/AREA URNS HPF: 22 /HPF (ref 0–5)

## 2025-08-22 PROCEDURE — 81001 URINALYSIS AUTO W/SCOPE: CPT

## 2025-08-22 PROCEDURE — 59025 FETAL NON-STRESS TEST: CPT

## 2025-08-22 PROCEDURE — 99213 OFFICE O/P EST LOW 20 MIN: CPT

## 2025-08-23 LAB — GP B STREP SPEC QL CULT: NORMAL

## 2025-08-25 ENCOUNTER — PATIENT MESSAGE (OUTPATIENT)
Age: 25
End: 2025-08-25

## 2025-08-25 DIAGNOSIS — N30.00 ACUTE CYSTITIS WITHOUT HEMATURIA: Primary | ICD-10-CM

## 2025-08-25 RX ORDER — CEPHALEXIN 500 MG/1
500 CAPSULE ORAL 3 TIMES DAILY
Qty: 21 CAPSULE | Refills: 0 | Status: SHIPPED | OUTPATIENT
Start: 2025-08-25 | End: 2025-09-01

## 2025-08-28 ENCOUNTER — ROUTINE PRENATAL (OUTPATIENT)
Age: 25
End: 2025-08-28

## 2025-08-28 VITALS — DIASTOLIC BLOOD PRESSURE: 74 MMHG | SYSTOLIC BLOOD PRESSURE: 122 MMHG | BODY MASS INDEX: 43.1 KG/M2 | WEIGHT: 259 LBS

## 2025-08-28 DIAGNOSIS — O99.210: ICD-10-CM

## 2025-08-28 DIAGNOSIS — Z86.718 HISTORY OF DEEP VEIN THROMBOSIS (DVT) DURING PREGNANCY: ICD-10-CM

## 2025-08-28 DIAGNOSIS — Z87.59 HISTORY OF DEEP VEIN THROMBOSIS (DVT) DURING PREGNANCY: ICD-10-CM

## 2025-08-28 DIAGNOSIS — O09.93 SUPERVISION OF HIGH RISK PREGNANCY IN THIRD TRIMESTER: Primary | ICD-10-CM

## 2025-08-29 RX ORDER — CEPHALEXIN 500 MG/1
500 CAPSULE ORAL 3 TIMES DAILY
Qty: 21 CAPSULE | Refills: 0 | Status: SHIPPED | OUTPATIENT
Start: 2025-08-29 | End: 2025-09-05

## 2025-09-03 ENCOUNTER — ROUTINE PRENATAL (OUTPATIENT)
Age: 25
End: 2025-09-03

## 2025-09-03 VITALS
WEIGHT: 257.2 LBS | SYSTOLIC BLOOD PRESSURE: 115 MMHG | BODY MASS INDEX: 42.8 KG/M2 | HEART RATE: 87 BPM | DIASTOLIC BLOOD PRESSURE: 77 MMHG

## 2025-09-03 DIAGNOSIS — O99.283 HYPOTHYROIDISM AFFECTING PREGNANCY IN THIRD TRIMESTER: ICD-10-CM

## 2025-09-03 DIAGNOSIS — O09.93 SUPERVISION OF HIGH RISK PREGNANCY IN THIRD TRIMESTER: Primary | ICD-10-CM

## 2025-09-03 DIAGNOSIS — O99.210: ICD-10-CM

## 2025-09-03 DIAGNOSIS — O99.213 OBESITY AFFECTING PREGNANCY IN THIRD TRIMESTER, UNSPECIFIED OBESITY TYPE: ICD-10-CM

## 2025-09-03 DIAGNOSIS — Z86.718 HISTORY OF MATERNAL DEEP VEIN THROMBOSIS (DVT): ICD-10-CM

## 2025-09-03 DIAGNOSIS — Z87.59 HISTORY OF MATERNAL DEEP VEIN THROMBOSIS (DVT): ICD-10-CM

## 2025-09-03 DIAGNOSIS — E03.9 HYPOTHYROIDISM AFFECTING PREGNANCY IN THIRD TRIMESTER: ICD-10-CM
